# Patient Record
Sex: MALE | Race: WHITE | NOT HISPANIC OR LATINO | Employment: OTHER | URBAN - NONMETROPOLITAN AREA
[De-identification: names, ages, dates, MRNs, and addresses within clinical notes are randomized per-mention and may not be internally consistent; named-entity substitution may affect disease eponyms.]

---

## 2018-01-15 ENCOUNTER — OFFICE VISIT CONVERTED (OUTPATIENT)
Dept: FAMILY MEDICINE CLINIC | Age: 68
End: 2018-01-15
Attending: NURSE PRACTITIONER

## 2018-01-15 ENCOUNTER — CONVERSION ENCOUNTER (OUTPATIENT)
Dept: FAMILY MEDICINE CLINIC | Age: 68
End: 2018-01-15

## 2018-01-16 LAB
ALBUMIN SERPL-MCNC: 4.1 G/DL
ALBUMIN/GLOB SERPL: 1.6 {RATIO}
ALP SERPL-CCNC: 169 IU/L
ALT SERPL-CCNC: 24 IU/L
AST SERPL-CCNC: 25 IU/L
BILIRUB SERPL-MCNC: 0.3 MG/DL
BUN SERPL-MCNC: 11 MG/DL
BUN/CREAT SERPL: 11
CALCIUM SERPL-MCNC: 9.4 MG/DL
CHLORIDE SERPL-SCNC: 103 MMOL/L
CO2 SERPL-SCNC: 25 MMOL/L
CONV TOTAL PROTEIN: 6.7 G/DL
CREAT UR-MCNC: 1.04 MG/DL
ERYTHROCYTE [DISTWIDTH] IN BLOOD BY AUTOMATED COUNT: 13.9 %
GLOBULIN UR ELPH-MCNC: 2.6 G/DL
GLUCOSE SERPL-MCNC: 81 MG/DL
HCT VFR BLD AUTO: 46.3 %
HCV AB SER DONR QL: <0.1 S/CO RATIO
HGB BLD-MCNC: 15.8 G/DL
MCH RBC QN AUTO: 32.1 PG
MCHC RBC AUTO-ENTMCNC: 34.1 G/DL
MCV RBC AUTO: 94 FL
PLATELET # BLD AUTO: 349 X10E3/UL
POTASSIUM SERPL-SCNC: 4.3 MMOL/L
PSA SERPL-MCNC: 5.8 NG/ML
RBC # BLD AUTO: 4.92 X10E6/UL
SODIUM SERPL-SCNC: 143 MMOL/L
WBC # BLD AUTO: 7.7 X10E3/UL

## 2018-01-18 LAB
CHOLEST SERPL-MCNC: 171 MG/DL
HDLC SERPL-MCNC: 32 MG/DL
LDLC SERPL CALC-MCNC: 100 MG/DL
TRIGL SERPL-MCNC: 197 MG/DL
VLDLC SERPL-MCNC: 39 MG/DL

## 2018-04-18 ENCOUNTER — CONVERSION ENCOUNTER (OUTPATIENT)
Dept: FAMILY MEDICINE CLINIC | Age: 68
End: 2018-04-18

## 2018-04-19 LAB
ALBUMIN SERPL-MCNC: 4.1 G/DL
ALBUMIN/GLOB SERPL: 1.8 {RATIO}
ALP SERPL-CCNC: 142 IU/L
ALT SERPL-CCNC: 41 IU/L
AST SERPL-CCNC: 33 IU/L
BILIRUB SERPL-MCNC: 0.4 MG/DL
BUN SERPL-MCNC: 13 MG/DL
BUN/CREAT SERPL: 13
CALCIUM SERPL-MCNC: 9.4 MG/DL
CHLORIDE SERPL-SCNC: 100 MMOL/L
CO2 SERPL-SCNC: 22 MMOL/L
CONV TOTAL PROTEIN: 6.4 G/DL
CREAT UR-MCNC: 0.99 MG/DL
GLOBULIN UR ELPH-MCNC: 2.3 G/DL
GLUCOSE SERPL-MCNC: 86 MG/DL
POTASSIUM SERPL-SCNC: 4.7 MMOL/L
PSA SERPL-MCNC: 4.6 NG/ML
SODIUM SERPL-SCNC: 142 MMOL/L

## 2018-04-20 LAB — GGT SERPL-CCNC: 14 IU/L

## 2018-07-16 ENCOUNTER — CONVERSION ENCOUNTER (OUTPATIENT)
Dept: FAMILY MEDICINE CLINIC | Age: 68
End: 2018-07-16

## 2018-07-19 LAB
ALBUMIN SERPL-MCNC: 3.8 G/DL
ALBUMIN/GLOB SERPL: 1.6 {RATIO}
ALP SERPL-CCNC: 140 IU/L
ALT SERPL-CCNC: 37 IU/L
AST SERPL-CCNC: 34 IU/L
BILIRUB SERPL-MCNC: 0.4 MG/DL
BUN SERPL-MCNC: 12 MG/DL
BUN/CREAT SERPL: 13
CALCIUM SERPL-MCNC: 8.9 MG/DL
CHLORIDE SERPL-SCNC: 104 MMOL/L
CO2 SERPL-SCNC: 23 MMOL/L
CONV TOTAL PROTEIN: 6.2 G/DL
CREAT UR-MCNC: 0.94 MG/DL
GLOBULIN UR ELPH-MCNC: 2.4 G/DL
GLUCOSE SERPL-MCNC: 93 MG/DL
GLUCOSE SERPL-MCNC: NORMAL MG/DL
KETONES UR QL STRIP: NORMAL
PH FLD: NORMAL [PH]
POTASSIUM SERPL-SCNC: 3.8 MMOL/L
PROT FLD-MCNC: NORMAL G/DL
PSA SERPL-MCNC: 4.9 NG/ML
SODIUM SERPL-SCNC: 141 MMOL/L
SP GR UR: NORMAL
SPECIMEN STATUS: NORMAL

## 2018-07-25 ENCOUNTER — OFFICE VISIT CONVERTED (OUTPATIENT)
Dept: FAMILY MEDICINE CLINIC | Age: 68
End: 2018-07-25
Attending: NURSE PRACTITIONER

## 2018-09-05 ENCOUNTER — OFFICE VISIT CONVERTED (OUTPATIENT)
Dept: FAMILY MEDICINE CLINIC | Age: 68
End: 2018-09-05
Attending: NURSE PRACTITIONER

## 2018-11-21 ENCOUNTER — OFFICE VISIT CONVERTED (OUTPATIENT)
Dept: FAMILY MEDICINE CLINIC | Age: 68
End: 2018-11-21
Attending: NURSE PRACTITIONER

## 2018-12-07 ENCOUNTER — OFFICE VISIT CONVERTED (OUTPATIENT)
Dept: FAMILY MEDICINE CLINIC | Age: 68
End: 2018-12-07
Attending: NURSE PRACTITIONER

## 2018-12-08 LAB
ALBUMIN SERPL-MCNC: 4 G/DL
ALBUMIN/GLOB SERPL: 1.4 {RATIO}
ALP SERPL-CCNC: 184 IU/L
ALT SERPL-CCNC: 31 IU/L
AMYLASE SERPL-CCNC: 107 U/L
AST SERPL-CCNC: 24 IU/L
BILIRUB SERPL-MCNC: 0.5 MG/DL
BUN SERPL-MCNC: 11 MG/DL
BUN/CREAT SERPL: 12
CALCIUM SERPL-MCNC: 9.5 MG/DL
CHLORIDE SERPL-SCNC: 100 MMOL/L
CO2 SERPL-SCNC: 23 MMOL/L
CONV TOTAL PROTEIN: 6.8 G/DL
CREAT UR-MCNC: 0.89 MG/DL
ERYTHROCYTE [DISTWIDTH] IN BLOOD BY AUTOMATED COUNT: 13.5 %
GLOBULIN UR ELPH-MCNC: 2.8 G/DL
GLUCOSE SERPL-MCNC: 93 MG/DL
HCT VFR BLD AUTO: 42.4 %
HGB BLD-MCNC: 14.5 G/DL
LIPASE SERPL-CCNC: 24 U/L
MCH RBC QN AUTO: 31.7 PG
MCHC RBC AUTO-ENTMCNC: 34.2 G/DL
MCV RBC AUTO: 93 FL
PLATELET # BLD AUTO: 372 X10E3/UL
POTASSIUM SERPL-SCNC: 4 MMOL/L
RBC # BLD AUTO: 4.58 X10E6/UL
SODIUM SERPL-SCNC: 142 MMOL/L
WBC # BLD AUTO: 10.7 X10E3/UL

## 2018-12-11 ENCOUNTER — OFFICE VISIT CONVERTED (OUTPATIENT)
Dept: SURGERY | Facility: CLINIC | Age: 68
End: 2018-12-11
Attending: UROLOGY

## 2019-01-07 ENCOUNTER — OFFICE VISIT CONVERTED (OUTPATIENT)
Dept: SURGERY | Facility: CLINIC | Age: 69
End: 2019-01-07
Attending: UROLOGY

## 2019-01-16 ENCOUNTER — OFFICE VISIT CONVERTED (OUTPATIENT)
Dept: FAMILY MEDICINE CLINIC | Age: 69
End: 2019-01-16
Attending: NURSE PRACTITIONER

## 2019-01-16 ENCOUNTER — HOSPITAL ENCOUNTER (OUTPATIENT)
Dept: OTHER | Facility: HOSPITAL | Age: 69
Discharge: HOME | End: 2019-01-16
Attending: NURSE PRACTITIONER

## 2019-01-21 LAB
ALBUMIN SERPL-MCNC: 4.1 G/DL
ALP SERPL-CCNC: 173 IU/L
ALT SERPL-CCNC: 20 IU/L
AST SERPL-CCNC: 18 IU/L
BASOPHILS # BLD AUTO: 0 X10E3/UL
BASOPHILS NFR BLD AUTO: 0 %
BILIRUB DIRECT SERPL-MCNC: 0.13 MG/DL
BILIRUB SERPL-MCNC: 0.3 MG/DL
CONV IMMATURE GRAN: 0 %
CONV TOTAL PROTEIN: 6.7 G/DL
EOSINOPHIL # BLD AUTO: 0.1 X10E3/UL
EOSINOPHIL # BLD AUTO: 1 %
ERYTHROCYTE [DISTWIDTH] IN BLOOD BY AUTOMATED COUNT: 13.8 %
HCT VFR BLD AUTO: 43.8 %
HGB BLD-MCNC: 14.9 G/DL
IMM GRANULOCYTES # BLD: 0 X10E3/UL
LYMPHOCYTES # BLD AUTO: 2.3 X10E3/UL
LYMPHOCYTES NFR BLD AUTO: 24 %
MCH RBC QN AUTO: 31.9 PG
MCHC RBC AUTO-ENTMCNC: 34 G/DL
MCV RBC AUTO: 94 FL
MONOCYTES # BLD AUTO: 0.9 X10E3/UL
MONOCYTES NFR BLD AUTO: 10 %
NEUTROPHILS # BLD AUTO: 6.1 X10E3/UL
NEUTROPHILS NFR BLD AUTO: 65 %
PLATELET # BLD AUTO: 329 X10E3/UL
PSA SERPL-MCNC: 5.2 NG/ML
RBC # BLD AUTO: 4.67 X10E6/UL
UREA BREATH TEST QL: NEGATIVE
WBC # BLD AUTO: 9.4 X10E3/UL

## 2019-02-01 ENCOUNTER — CONVERSION ENCOUNTER (OUTPATIENT)
Dept: GASTROENTEROLOGY | Facility: CLINIC | Age: 69
End: 2019-02-01

## 2019-02-01 ENCOUNTER — OFFICE VISIT CONVERTED (OUTPATIENT)
Dept: GASTROENTEROLOGY | Facility: CLINIC | Age: 69
End: 2019-02-01
Attending: PHYSICIAN ASSISTANT

## 2019-02-13 ENCOUNTER — HOSPITAL ENCOUNTER (OUTPATIENT)
Dept: GASTROENTEROLOGY | Facility: HOSPITAL | Age: 69
Setting detail: HOSPITAL OUTPATIENT SURGERY
Discharge: HOME | End: 2019-02-13
Attending: INTERNAL MEDICINE

## 2020-06-18 ENCOUNTER — HOSPITAL ENCOUNTER (OUTPATIENT)
Dept: OTHER | Facility: HOSPITAL | Age: 70
Discharge: HOME | End: 2020-06-18
Attending: FAMILY MEDICINE

## 2020-06-18 ENCOUNTER — OFFICE VISIT CONVERTED (OUTPATIENT)
Dept: FAMILY MEDICINE CLINIC | Age: 70
End: 2020-06-18
Attending: FAMILY MEDICINE

## 2020-06-21 LAB — SARS-COV-2 RNA SPEC QL NAA+PROBE: NOT DETECTED

## 2020-10-21 ENCOUNTER — HOSPITAL ENCOUNTER (OUTPATIENT)
Dept: OTHER | Facility: HOSPITAL | Age: 70
Discharge: HOME | End: 2020-10-21
Attending: FAMILY MEDICINE

## 2020-10-21 ENCOUNTER — CONVERSION ENCOUNTER (OUTPATIENT)
Dept: FAMILY MEDICINE CLINIC | Age: 70
End: 2020-10-21

## 2020-10-24 LAB — SARS-COV-2 RNA SPEC QL NAA+PROBE: NOT DETECTED

## 2021-03-19 ENCOUNTER — OFFICE VISIT CONVERTED (OUTPATIENT)
Dept: FAMILY MEDICINE CLINIC | Age: 71
End: 2021-03-19
Attending: NURSE PRACTITIONER

## 2021-04-07 ENCOUNTER — OFFICE VISIT CONVERTED (OUTPATIENT)
Dept: NEUROLOGY | Facility: CLINIC | Age: 71
End: 2021-04-07
Attending: PSYCHIATRY & NEUROLOGY

## 2021-05-11 NOTE — H&P
History and Physical      Patient Name: Tristan Bolivar   Patient ID: [id number]   Sex: Male   YOB: 1950    Primary Care Provider: Ashley GARCIA   Referring Provider: Ashley GARCIA    Visit Date: April 7, 2021    Provider: Ron Torres MD   Location: [facility]   Location Address: [address] [place] [address]  [id number]   Location Phone: [phone]          Chief Complaint     New patient presented to us today for tremors, patient has not had any head or brain imaging done recently.       History Of Present Illness  Tristan Bolivar is a 70 year old /White male who presents today to [facility] today referred from Ashley GARCIA.      70-year-old man evaluated for tremors.  His sister who is my patient in the past brought him here.  The patient has developmental delay and lives by himself.  He has never been  and has no children.  His sister states that he has had tremor for the last 5 years.  It is in both hands but worse on the right side.  It interferes with his eating and shaving.  He used to work at [employer] in [facility].  There is no family history of essential tremor.  He is taking primidone at 50 mg nightly.  This is not been increased.  He is also taking propranolol.  He is independent with all activities of daily living.  He does not drive.    The sister states that the patient had developmental delay.  He would not play with other children and he just looked around when he was a child.       Past Medical History  Tremors         Past Surgical History  Hip Surgery         Medication List  Centrum Silver 400-250 mcg oral tablet,chewable; Fish Oil 120-180 mg oral capsule; primidone 50 mg oral tablet; propranolol 10 mg oral tablet; Vitamin D3 400 unit oral capsule; Zyrtec 10 mg oral capsule         Allergy List  NO KNOWN DRUG ALLERGIES       Allergies Reconciled  Family Medical History  -  "No Family History of Colorectal Cancer         Social History  Tobacco (Current every day)         Review of Systems  · Constitutional  o Denies  o : chills, excessive sweating, fatigue, fever, sycope/passing out, weight gain, weight loss  · Eyes  o Denies  o : changes in vision, blurred vision, double vision  · HENT  o Admits  o : seasonal allergies  o Denies  o : hearing loss, ringing in the ears, ear aches, sore throat, nasal congestion, sinus pain, nose bleeds  · Cardiovascular  o Denies  o : blood clots, swollen legs, anemia, easy burising or bleeding, transfusions  · Respiratory  o Denies  o : shortness of breath, dry cough, productive cough, pneumonia, COPD  · Gastrointestinal  o Denies  o : dysphagia, reflux  · Genitourinary  o Denies  o : incontinence  · Neurologic  o Admits  o : tremor, loss of balance, numbness/tingling/paresthesia   o Denies  o : headache, seizure, stroke, falls, dizziness/vertigo, difficulty with sleep, difficulty with coordination, difficulty with dexterity, weakness  · Musculoskeletal  o Admits  o : low back pain  o Denies  o : neck stiffness/pain, swollen lymph nodes, muscle aches, joint pain, weakness, spasms, sciatica, pain radiating in arm, pain radiating in leg  · Endocrine  o Denies  o : diabetes, thyroid disorder  · Psychiatric  o Denies  o : anxiety, depression      Vitals  Date Time BP Position Site L\R Cuff Size HR RR TEMP (F) WT  HT  BMI kg/m2 BSA m2 O2 Sat FR L/min FiO2 HC       04/07/2021 10:38 /78 Sitting    83 - R   153lbs 9oz 5'  7\" 24.05 1.81             Physical Examination     He is alert, fluent, slow to answer, follows commands.  He has mild dysarthria.  EOMs are full all directions gaze, facial strength is full.  There is no weakness of the upper or lower extremities individual muscle testing.  Fine finger movements are intact.  There is a tremor noted on Archimedes spiral.  There is a mild tremor noted hands extended testing is seen which is the target.  " There is no rigidity or cogwheeling.  Reflexes are decreased in the biceps, triceps, patellar's and ankles.  Cerebellar testing is intact.  Station and gait he walks with slow gait and with stress gait there is posturing noted in the right arm and body.  He is able to tiptoe, heel walk, doug.  Heart is regular in rhythm normal in rate.           Assessment  · Essential tremor       Essential tremor     333.1/G25.0  I discussed with his sister and the patient that he has essential tremor. I will increase the primidone by 50 mg every 3 days at bedtime. He is to stay at the lowest dose that controls his tremor. I explained to the sister how to titrate the medication. She is the one to check on this medication. If his tremors are not improved by taking 250 mg nightly he is to increase it in 2 divided dosage morning and evening until he is taking up to 250 mg twice daily. He is to stay at the lowest dose that controls his tremor. The sister is well aware of this. I explained to the sister and the patient the adverse effects of higher doses of primidone including feeling like she is unsteady, confused, slurred speech, intoxicated.    If there is no improvement of his tremor with higher doses of primidone I will start him on higher doses of propranolol if there is no contraindication such as asthma. I will see him again in 1 month's time for follow-up.    Total time spent with patient coordinating patient care was 40 minutes.      Plan  · Medications  o primidone 50 mg oral tablet   SIG: take up to 5 tablets (250 mg) by oral route 2 times per day   DISP: (300) Tablet with 3 refills  Prescribed on 04/07/2021     o Medications have been Reconciled  o Transition of Care or Provider Policy  · Instructions  o Encouraged to follow-up with Primary Care Provider for preventative care.            Electronically Signed by: Ron Torres MD -Author on April 7, 2021 12:46:21 PM

## 2021-05-14 VITALS
HEIGHT: 67 IN | DIASTOLIC BLOOD PRESSURE: 78 MMHG | WEIGHT: 153.56 LBS | BODY MASS INDEX: 24.1 KG/M2 | HEART RATE: 83 BPM | SYSTOLIC BLOOD PRESSURE: 148 MMHG

## 2021-05-16 VITALS
DIASTOLIC BLOOD PRESSURE: 85 MMHG | OXYGEN SATURATION: 98 % | SYSTOLIC BLOOD PRESSURE: 139 MMHG | RESPIRATION RATE: 16 BRPM | HEIGHT: 67 IN | HEART RATE: 87 BPM | WEIGHT: 168 LBS | BODY MASS INDEX: 26.37 KG/M2

## 2021-05-16 VITALS — RESPIRATION RATE: 12 BRPM | BODY MASS INDEX: 27.51 KG/M2 | WEIGHT: 175.25 LBS | HEIGHT: 67 IN

## 2021-05-16 VITALS — HEIGHT: 67 IN | WEIGHT: 175 LBS | BODY MASS INDEX: 27.47 KG/M2 | RESPIRATION RATE: 16 BRPM

## 2021-05-18 NOTE — PROGRESS NOTES
Tristan Bolivar 1950     Office/Outpatient Visit    Visit Date: Wed, Nov 21, 2018 01:44 pm    Provider: Ashley Robertosn N.P. (Assistant: Sarah Spurling, MA)    Location: [facility]        Electronically signed by Ashley Robertson N.P. on  11/21/2018 03:09:11 PM                             SUBJECTIVE:        CC:     Emir is a 68 year old White male.  He has an extremely bad cough, his belly has been hurting from the cough. The cough started last week.  accompanied by sister, Bethany,  today         HPI:         Patient complains of cough.  Respiratory symptoms include chest congestion, cough, chest tightness, wheezing and abdominal pain from coughing.  Other symptoms include chest congestion and wheezing.  He denies ear complaints, fever, headache, nasal congestion or nasal discharge.  Sputum is described as clear.  He denies exposure to ill contacts.  He has already tried to relieve the symptoms with taking night and day quil.  Medical history is significant for COPD and tobacco use.      ROS:     CONSTITUTIONAL:  Negative for chills, fatigue and fever.      CARDIOVASCULAR:  Negative for chest pain and pedal edema.      RESPIRATORY:  Positive for recent cough, pleuritic chest pain and frequent wheezing.   Negative for dyspnea.      GASTROINTESTINAL:  Positive for abdominal pain ( muscles of his upper abdominal wall ).   Negative for constipation, diarrhea, heartburn, nausea or vomiting.      MUSCULOSKELETAL:  Positive for myalgias (abdominal muscles (from coughing)).      ENDOCRINE:  Negative for hair loss, polydipsia and polyphagia.      ALLERGIC/IMMUNOLOGIC:  Negative for seasonal allergies.      PSYCHIATRIC:  Negative for anxiety, depression and suicidal thoughts.          PMH/FMH/SH:     Last Reviewed on 9/05/2018 09:54 AM by Ashley Robertson    Past Medical History:             PAST MEDICAL HISTORY         Peptic Ulcer Disease     Renal Stones         PREVENTIVE HEALTH MAINTENANCE              EVALUATION FOR AORTIC ANEURYSM: has never been done The next one is due  NOW     COLORECTAL CANCER SCREENING: Up to date (colonoscopy q10y; sigmoidoscopy q5y; Cologuard q3y) was last done 3/9/2018, Results are in chart; colonoscopy with the following abnormalities noted-- tubular adenoma; The next colonoscopy is due  03/2021     DENTAL CLEANING: NOW     EYE EXAM: The next one is due  NOW     Hepatitis C Medicare Screening: DUE NOW     PSA: was last done 4/18/12         PAST MEDICAL HISTORY             ADVANCE DIRECTIVE Living will on file         Surgical History:         Fracture Repair: left hip;         Family History:     Father: Medical history unknown     Mother:    Positive for Alzheimer's Disease;     Brother(s):    Positive for Myocardial Infarction;     Sister(s):    Positive for Type 2 Diabetes;         Social History:     Occupation:    Disabled     Marital Status: Single     Children: None     A.D.L.s:  At his current level of functioning, he can converse in a meaningful manner, dress himself, recognize familiar faces and remember the current date.   He cannot bathe himself, clean the house, control his bladder, control his bowel function, cook meals, drive a car, feed himself, find his way home, live independently, ride public transportation, remember his name or remember where he lives.          Tobacco/Alcohol/Supplements:     Last Reviewed on 11/21/2018 01:48 PM by Spurling, Sarah C    Tobacco: He has a past history of cigarette smoking; quit date:  Threw all his cigarettes in the trash today. (11-21-18).  Non-drinker     Caffeine:  He admits to consuming caffeine via coffee ( 1 serving per day ) and tea ( 2 servings per day ).          Substance Abuse History:     Last Reviewed on 1/15/2018 10:35 AM by Ashley Robertson    None         Mental Health History:     Last Reviewed on 1/15/2018 09:45 AM by Ashley Robertson        OTHER (enter)developmental delay         Communicable Diseases (eg STDs):      Last Reviewed on 1/15/2018 09:45 AM by Ashley Robertson    Reportable health conditions; NEGATIVE             Current Problems:     Last Reviewed on 9/05/2018 09:54 AM by Ashley Robertson    GERD     Abdominal pain, other specified site     Acute bronchitis     Cigarette smoking     Essential Tremor         Immunizations:     Prevnar 13 (Pneumococcal PCV 13) 1/15/2018     Fluzone High-Dose pf (>=65 yr) 10/5/2016     Fluzone High-Dose pf (>=65 yr) 9/14/2017     Fluzone High-Dose pf (>=65 yr) 9/27/2018         Allergies:     Last Reviewed on 11/21/2018 01:44 PM by Spurling, Sarah C      No Known Drug Allergies.         Current Medications:     Last Reviewed on 11/21/2018 01:47 PM by Spurling, Sarah C    Propranolol HCl 40mg Tablet Take 1 tablet(s) by mouth bid     Cetirizine HCl 10mg Tablet 1 tab daily     Vitamin D3 5,000IU Capsules 1 capsule every day     Primidone 50mg Tablet 1 at bedtime     Centrum Silver Vitamin* vitamin 1 daily     fish oil 1 tab daily         OBJECTIVE:        Vitals:         Current: 11/21/2018 1:51:49 PM    Ht:  5 ft, 8.5 in;  Wt: 174.4 lbs;  BMI: 26.1    T: 98.6 F (oral);  BP: 129/71 mm Hg (left arm, sitting);  P: 80 bpm (left arm (BP Cuff), sitting);  sCr: 0.94 mg/dL;  GFR: 72.00    O2 Sat: 96 % (room air)        Exams:     PHYSICAL EXAM:     GENERAL: Vitals recorded well developed, well nourished;  no apparent distress;     NECK: range of motion is normal;     RESPIRATORY: normal appearance and symmetric expansion of chest wall; normal respiratory rate and pattern with no distress; decreased breath sounds throughout; expiratory wheezes in the RUL and RLL;     CARDIOVASCULAR: normal rate; rhythm is regular;  no edema;     GASTROINTESTINAL: mild LUQ and RUQ tenderness;     LYMPHATIC: no enlargement of cervical or facial nodes; no supraclavicular nodes;     MUSCULOSKELETAL: normal gait; normal range of motion of all major muscle groups; no limb or joint pain with range of motion;      NEUROLOGIC: mental status: alert and oriented x 3;     PSYCHIATRIC: appropriate affect and demeanor; normal speech pattern; normal thought and perception;         ASSESSMENT           466.0   J20.9  Acute bronchitis              DDx:     305.1   F17.200  Cigarette smoking              DDx:     789.09   R10.9  Abdominal pain, other specified site              DDx:         ORDERS:         Meds Prescribed:       Azithromycin 250mg Tablet 2 po today, 1 po x 4 days  #1 (One) tablet(s) Refills: 0       Guaifenesin 100mg/5ml Oral Liquid Take 1 teaspoon by mouth q4h prn cough  #6 (Six) oz Refills: 0       Medrol (Methylprednisolone) 4mg Dosepak Take as directed with food  #1 (One) dose pack Refills: 0         Other Orders:         DuoNeb (Ipratropium) unit does for nebulizer  (Send-Out)         4004F  Pt scrnd tobacco use rcvd tobacco cessation talk  (In-House)                   PLAN:          Acute bronchitis           Prescriptions:       Azithromycin 250mg Tablet 2 po today, 1 po x 4 days  #1 (One) tablet(s) Refills: 0       Guaifenesin 100mg/5ml Oral Liquid Take 1 teaspoon by mouth q4h prn cough  #6 (Six) oz Refills: 0       Medrol (Methylprednisolone) 4mg Dosepak Take as directed with food  #1 (One) dose pack Refills: 0           Orders:         DuoNeb (Ipratropium) unit does for nebulizer  (Send-Out)            Cigarette smoking reports that his last cigarette was last night and has 'thrown away' the rest of them and is trying to quit         RECOMMENDATIONS given include: Counseled on smoking cessation and advised of the benefits to patient's health if he were to stop smoking. Counseling for less than 3 minutes.            Orders:       4004F  Pt scrnd tobacco use rcvd tobacco cessation talk  (In-House)            Abdominal pain, other specified site keep cough under control and should self limit - if continues to worsen, or GI symptoms develop, will need to follow up             Patient Recommendations:         For  Cigarette smoking:         Stop smoking.              CHARGE CAPTURE           **Please note: ICD descriptions below are intended for billing purposes only and may not represent clinical diagnoses**        Primary Diagnosis:         466.0 Acute bronchitis            J20.9    Acute bronchitis, unspecified              Orders:          89813   Office/outpatient visit; established patient, level 3  (In-House)           305.1 Cigarette smoking            F17.200    Nicotine dependence, unspecified, uncomplicated              Orders:          4004F   Pt scrnd tobacco use rcvd tobacco cessation talk  (In-House)           789.09 Abdominal pain, other specified site            R10.9    Unspecified abdominal pain

## 2021-05-18 NOTE — PROGRESS NOTES
Tristan Bolivar 1950     Office/Outpatient Visit    Visit Date: Wed, Sep 5, 2018 09:07 am    Provider: Ashley Robertson N.P. (Assistant: Cheryl Ortiz MA)    Location: [facility]        Electronically signed by Ashley Robertson N.P. on  09/05/2018 09:55:15 AM                             SUBJECTIVE:        CC:     Emir is a 68 year old White male.  This is a follow-up visit.  The patient is accompanied into the exam room by his Sister - Bethany.  patient presents today for 6 week follow up         HPI:         Emir desires help with smoking cessation.  He reports smoking 1 1/2 packs per day.  He has smoked for 40+ years.  He smokes the most at home.  There is no significant contact with other smokers.  Emir has never previously tried to quit smoking.  Medical history is pertinent for Mild MRJamarcus Field was in 6 weeks ago with his sister Bethany and was put on Wellbutrin for cessation - and reports today, that he has not attempted to cut back and does not desire to do so.  His sister Bethany is with him and has tried to convince him of the benefits of quitting.  He continues to decline desire to quit     ROS:     CONSTITUTIONAL:  Negative for chills, fatigue, fever and weight change.      CARDIOVASCULAR:  Negative for chest pain, orthopnea, paroxysmal nocturnal dyspnea and pedal edema.      RESPIRATORY:  Positive for chronic cough, dyspnea and frequent wheezing.      GASTROINTESTINAL:  Negative for abdominal pain, heartburn, constipation, diarrhea, and stool changes.      PSYCHIATRIC:  Negative for anxiety and depression.          PMH/FMH/SH:     Last Reviewed on 9/05/2018 09:54 AM by Ashley Robertson    Past Medical History:             PAST MEDICAL HISTORY         Peptic Ulcer Disease     Renal Stones         PREVENTIVE HEALTH MAINTENANCE             EVALUATION FOR AORTIC ANEURYSM: has never been done The next one is due  NOW     COLORECTAL CANCER SCREENING: Up to date (colonoscopy q10y; sigmoidoscopy q5y;  Cologuard q3y) was last done 3/9/2018, Results are in chart; colonoscopy with the following abnormalities noted-- tubular adenoma; The next colonoscopy is due  03/2021     DENTAL CLEANING: NOW     EYE EXAM: The next one is due  NOW     Hepatitis C Medicare Screening: DUE NOW     PSA: was last done 4/18/12         PAST MEDICAL HISTORY             ADVANCE DIRECTIVE Living will on file         Surgical History:         Fracture Repair: left hip;         Family History:     Father: Medical history unknown     Mother:    Positive for Alzheimer's Disease;     Brother(s):    Positive for Myocardial Infarction;     Sister(s):    Positive for Type 2 Diabetes;         Social History:     Occupation:    Disabled     Marital Status: Single     Children: None     A.D.L.s:  At his current level of functioning, he can converse in a meaningful manner, dress himself, recognize familiar faces and remember the current date.   He cannot bathe himself, clean the house, control his bladder, control his bowel function, cook meals, drive a car, feed himself, find his way home, live independently, ride public transportation, remember his name or remember where he lives.          Tobacco/Alcohol/Supplements:     Last Reviewed on 9/05/2018 09:10 AM by Cheryl Ortiz    Tobacco: Current Smoker: He currently smokes every day, 1-1/2 packs per day.  Non-drinker     Caffeine:  He admits to consuming caffeine via coffee ( 1 serving per day ) and tea ( 2 servings per day ).          Substance Abuse History:     Last Reviewed on 1/15/2018 10:35 AM by Ashley Robertson    None         Mental Health History:     Last Reviewed on 1/15/2018 09:45 AM by Ashley Robertson        OTHER (enter)developmental delay         Communicable Diseases (eg STDs):     Last Reviewed on 1/15/2018 09:45 AM by Ashley Robertson    Reportable health conditions; NEGATIVE             Current Problems:     Last Reviewed on 9/05/2018 09:54 AM by Ashley Robertson    GERD      Elevated prostate specific antigen (PSA)     Cigarette smoking     Essential Tremor         Immunizations:     Prevnar 13 (Pneumococcal PCV 13) 1/15/2018     Fluzone High-Dose pf (>=65 yr) 10/5/2016     Fluzone High-Dose pf (>=65 yr) 9/14/2017         Allergies:     Last Reviewed on 9/05/2018 09:10 AM by Cheryl Ortiz      No Known Drug Allergies.         Current Medications:     Last Reviewed on 9/05/2018 09:10 AM by Cheryl Ortiz    Propranolol HCl 40mg Tablet Take 1 tablet(s) by mouth bid     Cetirizine HCl 10mg Tablet 1 tab daily     Wellbutrin SR 150mg Tablets, Sustained Release Take 1 tablet PO daily for 2 weeks, if tolerated increase to BID     Vitamin D3 5,000IU Capsules 1 capsule every day     Primidone 50mg Tablet 1 at bedtime     Centrum Silver Vitamin* vitamin 1 daily     fish oil 1 tab daily         OBJECTIVE:        Vitals:         Current: 9/5/2018 9:13:22 AM    Ht:  5 ft, 8.5 in;  Wt: 167 lbs;  BMI: 25.0    T: 97.9 F (oral);  BP: 132/69 mm Hg (left arm, sitting);  P: 80 bpm (left arm (BP Cuff), sitting);  sCr: 0.94 mg/dL;  GFR: 70.69        Exams:     PHYSICAL EXAM:     GENERAL: Vitals recorded well developed, well nourished;  well groomed;  no apparent distress;     NECK:  supple, full ROM; no thyromegaly; no carotid bruits;     RESPIRATORY: normal respiratory rate and pattern with no distress; decreased breath sounds throughout;     CARDIOVASCULAR: normal rate; rhythm is regular;  normal S1; normal S2; no systolic murmur; no cyanosis; no edema;     MUSCULOSKELETAL:  Normal range of motion, strength and tone;     NEUROLOGICAL:  cranial nerves, motor and sensory function, reflexes, gait and coordination are all intact;     PSYCHIATRIC:  appropriate affect and demeanor; normal speech pattern; grossly normal memory;         ASSESSMENT:           305.1   F17.200  Cigarette smoking              DDx:         PLAN:          Cigarette smoking I recommend that at this time, we stop the wellbutrin due to  his lack of desire to quit.  I have given him some ideas to try to help cut back - he has agreed to try - however, given his mental capacity, I feel that smoking cessation will be a challenge as he has a specific routine that he follows and smoking has become a part of that routine             CHARGE CAPTURE:           Primary Diagnosis:     305.1 Cigarette smoking            F17.200    Nicotine dependence, unspecified, uncomplicated              Orders:          28641   Office/outpatient visit; established patient, level 3  (In-House)

## 2021-05-18 NOTE — PROGRESS NOTES
Tristan Bolivar 1950     Office/Outpatient Visit    Visit Date: Fri, Dec 7, 2018 11:02 am    Provider: Ashley Robertson N.P. (Assistant: Mehdi Del Rosario)    Location: [facility]        Electronically signed by Ashley Robertson N.P. on  12/11/2018 09:59:24 AM                             SUBJECTIVE:        CC:     Emir is a 68 year old White male.  stomach and back pain since last week, runny nose, vomiting         HPI:         Patient complains of stomach pain.  Emir complains of abdominal pain that is diffuse in location.  There is some radiation to the flank and bilateral flank.      ROS:     CONSTITUTIONAL:  Negative for chills, fatigue, fever and weight change.      CARDIOVASCULAR:  Negative for chest pain, orthopnea, paroxysmal nocturnal dyspnea and pedal edema.      RESPIRATORY:  Positive for recent cough.   Negative for dyspnea or cough.      GASTROINTESTINAL:  Negative for abdominal pain, heartburn, constipation, diarrhea, and stool changes.      PSYCHIATRIC:  Negative for anxiety and depression.          PMH/FMH/SH:     Last Reviewed on 9/05/2018 09:54 AM by Ashley Robertson    Past Medical History:             PAST MEDICAL HISTORY         Peptic Ulcer Disease     Renal Stones         PREVENTIVE HEALTH MAINTENANCE             EVALUATION FOR AORTIC ANEURYSM: has never been done The next one is due  NOW     COLORECTAL CANCER SCREENING: Up to date (colonoscopy q10y; sigmoidoscopy q5y; Cologuard q3y) was last done 3/9/2018, Results are in chart; colonoscopy with the following abnormalities noted-- tubular adenoma; The next colonoscopy is due  03/2021     DENTAL CLEANING: NOW     EYE EXAM: The next one is due  NOW     Hepatitis C Medicare Screening: DUE NOW     PSA: was last done 4/18/12         PAST MEDICAL HISTORY             ADVANCE DIRECTIVE Living will on file         Surgical History:         Fracture Repair: left hip;         Family History:     Father: Medical history unknown      Mother:    Positive for Alzheimer's Disease;     Brother(s):    Positive for Myocardial Infarction;     Sister(s):    Positive for Type 2 Diabetes;         Social History:     Occupation:    Disabled     Marital Status: Single     Children: None     A.D.L.s:  At his current level of functioning, he can converse in a meaningful manner, dress himself, recognize familiar faces and remember the current date.   He cannot bathe himself, clean the house, control his bladder, control his bowel function, cook meals, drive a car, feed himself, find his way home, live independently, ride public transportation, remember his name or remember where he lives.          Tobacco/Alcohol/Supplements:     Last Reviewed on 12/07/2018 11:06 AM by Mehdi Del Rosario    Tobacco: Current Smoker: He currently smokes every day, 1 pack per day.  Non-drinker     Caffeine:  He admits to consuming caffeine via coffee ( 1 serving per day ) and tea ( 2 servings per day ).          Substance Abuse History:     Last Reviewed on 1/15/2018 10:35 AM by Ashley Robertson    None         Mental Health History:     Last Reviewed on 1/15/2018 09:45 AM by Ashely Robertson        OTHER (enter)developmental delay         Communicable Diseases (eg STDs):     Last Reviewed on 1/15/2018 09:45 AM by Ashley Robertson    Reportable health conditions; NEGATIVE             Current Problems:     Last Reviewed on 9/05/2018 09:54 AM by Ashley Robertson    GERD     Nausea     Cough     Stomach pain     Abdominal pain, other specified site     Acute bronchitis     Cigarette smoking     Essential Tremor         Immunizations:     Prevnar 13 (Pneumococcal PCV 13) 1/15/2018     Fluzone High-Dose pf (>=65 yr) 10/5/2016     Fluzone High-Dose pf (>=65 yr) 9/14/2017     Fluzone High-Dose pf (>=65 yr) 9/27/2018         Allergies:     Last Reviewed on 12/07/2018 11:06 AM by Mehdi Del Rosario      No Known Drug Allergies.         Current Medications:     Last Reviewed on 12/07/2018  11:07 AM by Mehdi Del Rosario    Propranolol HCl 40mg Tablet Take 1 tablet(s) by mouth bid     Cetirizine HCl 10mg Tablet 1 tab daily     Vitamin D3 5,000IU Capsules 1 capsule every day     Primidone 50mg Tablet 1 at bedtime     Centrum Silver Vitamin* vitamin 1 daily     fish oil 1 tab daily         OBJECTIVE:        Vitals:         Current: 12/7/2018 11:09:18 AM    Ht:  5 ft, 8.5 in;  Wt: 170.6 lbs;  BMI: 25.6    T: 97.6 F (oral);  BP: 156/91 mm Hg (right arm, sitting);  P: 98 bpm (right arm (BP Cuff), sitting);  sCr: 0.94 mg/dL;  GFR: 71.33        Exams:     PHYSICAL EXAM:     GENERAL: Vitals recorded well developed, well nourished;  well groomed;  no apparent distress;     NECK:  supple, full ROM; no thyromegaly; no carotid bruits;     RESPIRATORY: normal respiratory rate and pattern with no distress; normal breath sounds with no rales, rhonchi, wheezes or rubs;     CARDIOVASCULAR: normal rate; rhythm is regular;  normal S1; normal S2; no systolic murmur; no cyanosis; no edema;     GASTROINTESTINAL: nontender, nondistended; no hepatosplenomegaly or masses; no bruits;     SKIN:  no significant rashes or lesions; no suspicious moles;     MUSCULOSKELETAL: normal gait; CVA tenderness over bilateral flank  left worse than right;     NEUROLOGICAL:  cranial nerves, motor and sensory function, reflexes, gait and coordination are all intact;     PSYCHIATRIC:  appropriate affect and demeanor; normal speech pattern; grossly normal memory;         ASSESSMENT:           789.07   R10.84  Stomach pain              DDx:     786.2   R05  Cough              DDx:     787.02   R11.0  Nausea              DDx:         ORDERS:         Meds Prescribed:       Zofran (Ondansetron HCl) 4mg Tablet 1 po q 6 hours prn  #20 (Twenty) tablet(s) Refills: 0         Radiology/Test Orders:       [id number]  Radiologic exam chest 2 views  (Send-Out)           Lab Orders:       [id number]  [id number] [facility] Amylase, Serum  (Send-Out)         [id number]  [id number] [facility]  CBC without diff  (Send-Out)         [id number]  [id number] [facility] Comp Metabolic Panel (14)  (Send-Out)         [id number]  [id number] [facility] Lipase, Serum  (Send-Out)         [id number]  [id number] [facility] Urinalysis, automated with REFLEX to culture  (Send-Out)                   PLAN:          Stomach pain     LABORATORY:  Labs ordered to be performed today at [facility] include amylase, CBC W/O DIFF, Lipase, and Urinalysis with REFLEX to culture.  CMP           Orders:       [id number]  [id number] [facility] Amylase, Serum  (Send-Out)         [id number]  [id number] [facility] CBC without diff  (Send-Out)         [id number]  [id number] [facility] Comp Metabolic Panel (14)  (Send-Out)         [id number]  [id number] [facility] Lipase, Serum  (Send-Out)         [id number]  [id number] [facility] Urinalysis, automated with REFLEX to culture  (Send-Out)            Cough         RADIOLOGY:  I have ordered a chest x-ray (PA and lateral) to be done today.            Orders:       [id number]  Radiologic exam chest 2 views  (Send-Out)            Nausea           Prescriptions:       Zofran (Ondansetron HCl) 4mg Tablet 1 po q 6 hours prn  #20 (Twenty) tablet(s) Refills: 0             CHARGE CAPTURE:           Primary Diagnosis:     789.07 Stomach pain            R10.84    Generalized abdominal pain              Orders:          [id number]   Office/outpatient visit; established patient, level 3  (In-House)           786.2 Cough            R05    Cough    787.02 Nausea            R11.0    Nausea

## 2021-05-18 NOTE — PROGRESS NOTES
Tristan Bolivar 1950     Office/Outpatient Visit    Visit Date: Wed, Jan 16, 2019 10:31 am    Provider: Ashley Robertson N.P. (Assistant: Edel Ferraro)    Location: [facility]        Electronically signed by Ashley Robertson N.P. on  01/19/2019 09:39:19 PM                             SUBJECTIVE:        CC:     Emir is a 68 year old White male.  The patient is accompanied into the exam room by his sister.  back and stomach pain follow up         HPI:         Patient complains of low back pain.  Reason for visit: Pain.  This is a follow-up visit. His symptoms are unchanged since last visit.  The discomfort is most prominent in the lower lumbar spine.  This radiates to the abdomen.  He characterizes it as intermittent and moderate in intensity.  The event which precipitated this pain was Recently had ESWL for kidney stones - was evaluated by urology for this pain after procedure and felt that this was not related to his pain.  Associated symptoms include nausea.  He denies fever or vomiting.  He notes some pain relief with pain medication.  The pain worsens with seems like his pain is worse at night - he is calling Bethany (his sister) around 2am stating that his pain is bad.      ROS:     CONSTITUTIONAL:  Negative for chills, fatigue and fever.      CARDIOVASCULAR:  Negative for chest pain and pedal edema.      RESPIRATORY:  Positive for recent cough and wheezing ( (current smoker) ).   Negative for dyspnea.      GASTROINTESTINAL:  Positive for abdominal pain ( periumbilical; diffuse ), anorexia, nausea and vomiting.   Negative for acid reflux symptoms, constipation or diarrhea.      GENITOURINARY:  Positive for nocturia, recent stones and urinary frequency.   Negative for dysuria, hematuria or change in urine stream.      MUSCULOSKELETAL:  Positive for back pain ( acute ).   Negative for arthralgias or myalgias.      INTEGUMENTARY:  Negative for pruritis and rash.      ENDOCRINE:  Negative for hair  · Status post ERCP yesterday  · Clinically improving LFTs  · Continue to trend  · Continue IV antibiotics loss, polydipsia and polyphagia.      ALLERGIC/IMMUNOLOGIC:  Negative for seasonal allergies.      PSYCHIATRIC:  Negative for anxiety, depression and suicidal thoughts.          PMH/FMH/SH:     Last Reviewed on 9/05/2018 09:54 AM by Ashley Robertson    Past Medical History:             PAST MEDICAL HISTORY         Peptic Ulcer Disease     Renal Stones         PREVENTIVE HEALTH MAINTENANCE             EVALUATION FOR AORTIC ANEURYSM: has never been done The next one is due  NOW     COLORECTAL CANCER SCREENING: Up to date (colonoscopy q10y; sigmoidoscopy q5y; Cologuard q3y) was last done 3/9/2018, Results are in chart; colonoscopy with the following abnormalities noted-- tubular adenoma; The next colonoscopy is due  03/2021     DENTAL CLEANING: NOW     EYE EXAM: The next one is due  NOW     Hepatitis C Medicare Screening: DUE NOW     PSA: was last done 4/18/12         PAST MEDICAL HISTORY             ADVANCE DIRECTIVE Living will on file         PAST MEDICAL HISTORY         Positive for    Renal Stones: dx'd in 12/2018; he has undergone ureteral stent placement; ;         Surgical History:         Fracture Repair: left hip;         Family History:     Father: Medical history unknown     Mother:    Positive for Alzheimer's Disease;     Brother(s):    Positive for Myocardial Infarction;     Sister(s):    Positive for Type 2 Diabetes;         Social History:     Occupation:    Disabled     Marital Status: Single     Children: None     A.D.L.s:  At his current level of functioning, he can bathe himself, control his bladder, control his bowel function, converse in a meaningful manner, dress himself, feed himself, live independently, recognize familiar faces and remember his name.   He cannot clean the house, cook meals, drive a car, remember where he lives or remember the current date.          Tobacco/Alcohol/Supplements:     Last Reviewed on 1/16/2019 10:31 AM by Edel Ferraro    Tobacco: Current Smoker: He currently  smokes every day, 1 pack per day.  Non-drinker     Caffeine:  He admits to consuming caffeine via coffee ( 1 serving per day ) and tea ( 2 servings per day ).          Substance Abuse History:     Last Reviewed on 1/15/2018 10:35 AM by Ashley Robertson    None         Mental Health History:     Last Reviewed on 1/15/2018 09:45 AM by Ashley Robertson        OTHER (enter)developmental delay         Communicable Diseases (eg STDs):     Last Reviewed on 1/15/2018 09:45 AM by Ashley Robertson    Reportable health conditions; NEGATIVE             Current Problems:     Last Reviewed on 9/05/2018 09:54 AM by Ashley Robertson    Mild intellectual disabilities     Low back pain     kidney stones     kidney stones (12/18)     GERD     Abdominal pain, other specified site     Other abnormal laboratory result on blood     Elevated PSA     Nausea     Cough     Cigarette smoking     Essential Tremor         Immunizations:     Prevnar 13 (Pneumococcal PCV 13) 1/15/2018     Fluzone High-Dose pf (>=65 yr) 10/5/2016     Fluzone High-Dose pf (>=65 yr) 9/14/2017     Fluzone High-Dose pf (>=65 yr) 9/27/2018         Allergies:     Last Reviewed on 1/16/2019 10:31 AM by Edel Ferraro      No Known Drug Allergies.         Current Medications:     Last Reviewed on 1/16/2019 10:31 AM by Edel Ferraro    Propranolol HCl 40mg Tablet Take 1 tablet(s) by mouth bid     Cetirizine HCl 10mg Tablet 1 tab daily     Vitamin D3 5,000IU Capsules 1 capsule every day     Primidone 50mg Tablet 1 at bedtime     [facility] Silver Vitamin* vitamin 1 daily     fish oil 1 tab daily     Omeprazole 20mg Capsules, Extended Release 1 capsule daily     Zofran 4mg Tablet 1 po q 6 hours prn         OBJECTIVE:        Vitals:         Current: 1/16/2019 10:35:45 AM    Ht:  5 ft, 8.5 in;  Wt: 170.8 lbs;  BMI: 25.6    T: 98.4 F (oral);  BP: 130/72 mm Hg (left arm, sitting);  P: 82 bpm (left arm (BP Cuff), sitting);  sCr: 0.89 mg/dL;  GFR: 75.38        Exams:     PHYSICAL EXAM:      GENERAL: Vitals recorded well developed, well nourished;  no apparent distress;     NECK: range of motion is normal;     RESPIRATORY: normal appearance and symmetric expansion of chest wall; normal respiratory rate and pattern with no distress; normal breath sounds with no rales, rhonchi, wheezes or rubs;     CARDIOVASCULAR: normal rate; rhythm is regular;     GASTROINTESTINAL: nontender; normal bowel sounds;     LYMPHATIC: no enlargement of cervical or facial nodes; no supraclavicular nodes;     MUSCULOSKELETAL: normal gait; normal range of motion of all major muscle groups; no limb or joint pain with range of motion;     NEUROLOGIC: mental status: alert and oriented x 3; per baseline     PSYCHIATRIC: appropriate affect and demeanor; normal speech pattern; normal thought and perception;         Lab/Test Results:             Glucose, Urine:  Neg (01/16/2019),     Bilirubin, urine:  Negative (01/16/2019),     Ketones, Urine Strip:  Negative (01/16/2019),     Specific Gravity, urine:  1.020 (01/16/2019),     Blood in Urine:  negative (01/16/2019),     pH, urine:  6.5 (01/16/2019),     Protein Urine QL:  negative (01/16/2019),     Urobilinogen, urine:  0.2 E.U./dL (01/16/2019),     Nitrite, Urine:  Negative (01/16/2019),     Leukoctyes, urine:  Negative (01/16/2019),     Appearance:  Slightly Cloudy (01/16/2019),     collection source:  Clean-catch (01/16/2019),     Color:  Dark Yellow (01/16/2019),     Performed by::  KG (01/16/2019),             ASSESSMENT           724.2   M54.5  Low back pain              DDx:     790.93   R97.20  Elevated PSA              DDx:     790.99   R79.9  Other abnormal laboratory result on blood              DDx:     789.09   R10.9  Abdominal pain, other specified site              DDx:         ORDERS:         Radiology/Test Orders:       [id number]  Ultrasound Right Upper Quadrant abdomen limited  (Send-Out)           Lab Orders:       [id number]  Urinalysis, automated, without microscopy   (In-House)         [id number]  [id number] [facility] Prostate-Specific Ag, Serum  (Send-Out)         [id number]  [id number] [facility] Hepatic Function Panel (7)  (Send-Out)         [id number]  [id number] [facility] CBC with Differential/Platelet  (Send-Out)         [id number]  [id number] H. pylori Breath test  (Send-Out)                   PLAN:          Low back pain     LABORATORY:  Labs ordered to be performed today include UA dip in office no micro.            Orders:       [id number]  Urinalysis, automated, without microscopy  (In-House)            Elevated PSA will follow up on this lab     LABORATORY:  Labs ordered to be performed today at [facility] include PSA.            Orders:       [id number]  [id number] [facility] Prostate-Specific Ag, Serum  (Send-Out)            Other abnormal laboratory result on blood     LABORATORY:  Labs ordered to be performed today at [facility] include.  Liver Function Panel           Orders:       [id number]  [id number] [facility]  (Send-Out)            Abdominal pain, other specified site     LABORATORY:  Labs ordered to be performed today at [facility] include CBC and H. pylori breath test.      RADIOLOGY:  I have ordered Abdominal Ultrasound Limited Right Upper Quadrant to be done today.            Orders:       [id number]  Ultrasound Right Upper Quadrant abdomen limited  (Send-Out)         [id number]  [id number] [facility] CBC with Differential/Platelet  (Send-Out)         [id number]  [id number] H. pylori Breath test  (Send-Out)               CHARGE CAPTURE           **Please note: ICD descriptions below are intended for billing purposes only and may not represent clinical diagnoses**        Primary Diagnosis:         724.2 Low back pain            M54.5    Low back pain              Orders:          [id number]   Office/outpatient visit; established patient, level 3  (In-House)             [id number]   Urinalysis, automated, without microscopy  (In-House)           790.93 Elevated PSA            R97.20    Elevated prostate specific antigen [PSA]    790.99 Other  abnormal laboratory result on blood            R79.9    Abnormal finding of blood chemistry, unspecified    789.09 Abdominal pain, other specified site            R10.9    Unspecified abdominal pain        ADDENDUMS:      ____________________________________    Date: 01/18/2019 11:47 AM    Author: Delilah Lorenzo         Visit Note Faxed to:        [facility]ue[facility]  (Gastroenterology); Number [phone]

## 2021-05-18 NOTE — PROGRESS NOTES
Tristan Bolivar 1950     Office/Outpatient Visit    Visit Date: Mon, Magdy 15, 2018 09:29 am    Provider: Ashley Robertson N.P. (Assistant: Sarah Lang MA)    Location: [facility]        Electronically signed by Ashley Robertson N.P. on  01/15/2018 12:49:17 PM                             SUBJECTIVE:        CC: Medicare wellness, get up to date on everything (accompanied by sister)         HPI:         Emir is here for a Medicare wellness visit.  Individual and family medical history was reviewed and updated A list of current providers and suppliers reviewed and updated A current height, weight, BMI, blood pressure, and pulse were recorded in the vitals section of the note and have been reviewed A review of possible cognitive impairment was performed and the following was noted: he is having difficulty driving;  not experiencing changes in memory;  he is having trouble with his finances He does have a history of developmental delay, Sister is the POA and is present with him today   She reports that he is independent at home, just requires a family member checking on him daily for any additional needs A review of functional ability and level of safety was performed and was negative He denies having trouble hearing the TV/radio when others do not, having to strain to hear or understand conversations and wearing hearing aid(s).  Concerning home safety, he reports that at home he DOES have grab bars in the bath and functioning smoke alarms, but not throw rugs, poor lighting or a slippery bath or shower.      Physical Activity: ** Never exercises; Has not had any falls or only one fall without injury in the past year.  Advance Care Directive updated today in [facility] Tobacco: Current Smoker: Currently smokes cigarettes every day.    Preventative Health updated today.      ROS:     CONSTITUTIONAL:  Negative for chills, fatigue and fever.      EYES:  Positive for use of glasses and cataract.      E/N/T:   "Positive for diminished hearing ( \"too much wax\" ).   Negative for sore throat.      CARDIOVASCULAR:  Positive for chest pain ( sister reports on occasion will grab chest and complain of pain ).   Negative for pedal edema.      RESPIRATORY:  Positive for wheezing ( per sister ).   Negative for recent cough or dyspnea.      GASTROINTESTINAL:  Negative for abdominal pain, constipation, diarrhea, nausea and vomiting.      GENITOURINARY:  Negative for dysuria, nocturia and change in urine stream.      MUSCULOSKELETAL:  Positive for joint stiffness (left hip).   Negative for arthralgias or myalgias.      NEUROLOGICAL:  Positive for tremor (better with medication, but still present).   Negative for paresthesias.      ENDOCRINE:  Negative for hair loss, polydipsia and polyphagia.      ALLERGIC/IMMUNOLOGIC:  Negative for seasonal allergies.      PSYCHIATRIC:  Negative for anxiety, crying spells, depression, feelings of stress, anhedonia, sadness, sleep disturbance and suicidal thoughts.          PMH/FMH/SH:     Last Reviewed on 1/15/2018 09:45 AM by Ashley Robertson    Past Medical History:             PAST MEDICAL HISTORY         Peptic Ulcer Disease     Renal Stones         PREVENTIVE HEALTH MAINTENANCE             EVALUATION FOR AORTIC ANEURYSM: has never been done The next one is due  NOW     COLORECTAL CANCER SCREENING: colonoscopy with the following abnormalities noted-- Polyp(s) and recommended 5 year follow up; The next colonoscopy is due  NOW     DENTAL CLEANING: NOW     EYE EXAM: The next one is due  NOW     Hepatitis C Medicare Screening: DUE NOW     PSA: was last done 4/18/12         PAST MEDICAL HISTORY             ADVANCE DIRECTIVE Living will on file         Surgical History:         Fracture Repair: left hip;         Family History:     Father: Medical history unknown     Mother:    Positive for Alzheimer's Disease;     Brother(s):    Positive for Myocardial Infarction;     Sister(s):    Positive for Type 2 " Diabetes;         Social History:     Occupation:    Disabled     Marital Status: Single     Children: None     A.D.L.s:  At his current level of functioning, he can converse in a meaningful manner, dress himself, recognize familiar faces and remember the current date.   He cannot bathe himself, clean the house, control his bladder, control his bowel function, cook meals, drive a car, feed himself, find his way home, live independently, ride public transportation, remember his name or remember where he lives.          Tobacco/Alcohol/Supplements:     Last Reviewed on 1/15/2018 09:45 AM by Ashley Robertson    Tobacco: Current Smoker: He currently smokes every day, 1-1/2 packs per day.  Non-drinker     Caffeine:  He admits to consuming caffeine via coffee ( 1 serving per day ) and tea ( 2 servings per day ).          Substance Abuse History:     Last Reviewed on 1/15/2018 10:35 AM by Ashley Robertson    None         Mental Health History:     Last Reviewed on 1/15/2018 09:45 AM by Ashley Robertson        OTHER (enter)developmental delay         Communicable Diseases (eg STDs):     Last Reviewed on 1/15/2018 09:45 AM by Ashley Robertson    Reportable health conditions; NEGATIVE             Current Problems:     Last Reviewed on 1/15/2018 09:45 AM by Ashley Robertson    Hip pain     GERD     Screening test for viral diseases - other     Screening for colon cancer     Screening for cardiovascular conditions     Cigarette smoking     Screening for prostate cancer     Cerumen impaction     Chest pain, other type     Essential Tremor         Immunizations:     Prevnar 13 (Pneumococcal PCV 13) 1/15/2018     Fluzone High-Dose pf (>=65 yr) 10/5/2016     Fluzone High-Dose pf (>=65 yr) 9/14/2017         Allergies:     Last Reviewed on 1/15/2018 09:45 AM by Ashley Robertson      No Known Drug Allergies.         Current Medications:     Last Reviewed on 1/15/2018 09:45 AM by Ashley Robertson    Centrum Silver Vitamin*  vitamin 1 daily     fish oil 1 tab daily     Propranolol HCl 40mg Tablet Take 1 tablet(s) by mouth bid         OBJECTIVE:        Vitals:         Current: 1/15/2018 9:31:43 AM    Ht:  5 ft, 8.5 in;  Wt: 169.7 lbs;  BMI: 25.4    T: 96.7 F (oral);  BP: 138/90 mm Hg (left arm, sitting);  P: 87 bpm (left arm (BP Cuff), sitting);  sCr: 1.14 mg/dL;  GFR: 59.46    VA: 20/25 OD, 20/25 OS (near, with correction)        Exams:     PHYSICAL EXAM:     GENERAL: Vitals recorded well developed, well nourished;  no apparent distress;     EYES: PERRL, EOMI     E/N/T: EARS: external auditory canal normal bilaterally and occluded by cerumen bilaterally;  bilateral TMs are normal;  NOSE:  normal nasal mucosa, septum, turbinates, and sinuses; OROPHARYNX:  normal mucosa, dentition, gingiva, and posterior pharynx;     NECK: range of motion is normal;     RESPIRATORY: normal appearance and symmetric expansion of chest wall; normal respiratory rate and pattern with no distress; normal breath sounds with no rales, rhonchi, wheezes or rubs;     CARDIOVASCULAR: normal rate; rhythm is regular;  no edema;     GASTROINTESTINAL: nontender; normal bowel sounds; no organomegaly;     LYMPHATIC: no enlargement of cervical or facial nodes; no supraclavicular nodes;     MUSCULOSKELETAL: gait: slowed and unsteady;  normal range of motion of all major muscle groups; pain with range of motion in: left hip;     NEUROLOGIC: mental status: alert and oriented x 3; GROSSLY INTACT     PSYCHIATRIC: appropriate affect and demeanor; normal speech pattern; normal thought and perception; Depression screen is performed and is negative.          Lab/Test Results:         LABORATORY RESULTS: EKG performed by tls         Procedures:     Cerumen impaction         Cerumen/Wax removal: Cerumen impaction is noted in both ears The degree of wax accumulation is moderate.  With minimal difficulty, using a syringe irrigation, the wax is removed.  Removed from ear was hard balls  of wax.  The patient tolerated the procedure well.      There were no complications.  Performed by:tls         Vaccination against streptococcus pneumoniae     1. Prevnar 13 (pneumococcal PCV13): 0.5 ml unit dose given IM in the left upper arm; administered by kh             ASSESSMENT           V70.0   Z00.01  Health checkup              DDx:     786.59   R07.89  Chest pain, other type              DDx:     781.0   R25.1  Essential Tremor              DDx:     380.4   H61.23  Cerumen impaction              DDx:     305.1   F17.200  Cigarette smoking              DDx:     V76.44   Z12.5  Screening for prostate cancer              DDx:     V81.2   Z13.6  Screening for cardiovascular conditions              DDx:     V76.49   Z12.11  Screening for colon cancer              DDx:     V73.89   Z11.59  Screening test for viral diseases - other              DDx:     V03.82   Z23  Vaccination against streptococcus pneumoniae              DDx:     719.45   M25.552  Hip pain              DDx:         ORDERS:         Radiology/Test Orders:       04704  Electrocardiogram, routine with at least 12 leads; with interpretation and report  (In-House)         76389  US abdominal aorta real time screen study AAA  (Send-Out)           Aorta Screening Ultrasound Medicare  (Send-Out)         31096  Chest x-ray, two views, frontal and lateral  (Send-Out)         [id number]  Left radiologic exam, hip, unilateral; complete, minimum of two views  (Send-Out)           Lab Orders:       [id number]  [id number] [facility] CBC without diff  (Send-Out)         06353  [id number] [facility] Comp Metabolic Panel (14)  (Send-Out)         83228  [id number] [facility] Prostate-Specific Ag, Serum  (Send-Out)         47378  [id number] [facility] Hepatitic C (HCV) Antibody Medicare screen  (Send-Out)           Procedures Ordered:         Annual wellness visit, includes a PPPS, subsequent visit  (In-House)         94046GZ  Removal of impacted cerumen right ear (NURSE)  (In-House)          REFER  Referral to Specialist or Other Facility  (Send-Out)         REFER  Referral to Specialist or Other Facility  (Send-Out)         15057  Pneumococcal conjugate vaccine, 13 valent, for intramuscular use  (In-House)           Other Orders:         Negative EtOH screen  (In-House)         1101F  Pt screen for fall risk; document no falls in past year or only 1 fall w/o injury in past year (STEVIE)  (In-House)         51520  Behav assmt w/score & docd/stand instrument  (In-House)         4004F  Pt scrnd tobacco use rcvd tobacco cessation talk  (In-House)           Depression screen negative  (In-House)         [id number]  Removal of impacted cerumen left ear (NURSE)  (In-House)           Administration of pneumococcal vaccine (x1)                 PLAN:          Health checkup Needs an appt late in the day - Monday preferably - reevaluate tremors     LABORATORY:  Labs ordered to be performed today at [facility] include CBC W/O DIFF.  CMP           Orders:         Negative EtOH screen  (In-House)         1101F  Pt screen for fall risk; document no falls in past year or only 1 fall w/o injury in past year (STEVIE)  (In-House)         24052  Behav assmt w/score & docd/stand instrument  (In-House)           Annual wellness visit, includes a PPPS, subsequent visit  (In-House)           Depression screen negative  (In-House)         78426  [id number] [facility] CBC without diff  (Send-Out)         81646  [id number] [facility] Comp Metabolic Panel (14)  (Send-Out)            Chest pain, other type         TESTS/PROCEDURES:  Will proceed with an ECG to be performed/scheduled now.      RECOMMENDATIONS given include: EKG looks ok today - will forwared to Dr. Dunn for her review and recommendations if any.      FOLLOW-UP: Schedule follow-up appointments on a p.r.n. basis..            Orders:       28594  Electrocardiogram, routine with at least 12 leads; with interpretation and report  (In-House)             Essential Tremor Late After noon         REFERRALS:  Referral initiated to a neurologist ( Dr. Rosa Callahan; for evaluation of Tremors ).            Orders:       REFER  Referral to Specialist or Other Facility  (Send-Out)            Cerumen impaction           Orders:       [id number]  Removal of impacted cerumen left ear (NURSE)  (In-House)         [id number]  Removal of impacted cerumen right ear (NURSE)  (In-House)            Cigarette smoking         RADIOLOGY:  I have ordered a chest x-ray (PA and lateral) to be done today.            Orders:         Aorta Screening Ultrasound Medicare  (Send-Out)         92408  Chest x-ray, two views, frontal and lateral  (Send-Out)         4004F  Pt scrnd tobacco use rcvd tobacco cessation talk  (In-House)            Screening for prostate cancer     LABORATORY:  Labs ordered to be performed today at [facility] include PSA.            Orders:       12825  [id number] [facility] Serum  (Send[facility])            Screening for cardiovascular conditions           Orders:       00173   abdominal aorta real time screen study AAA  (Send-Out)            Screening for colon cancer         REFERRALS:  Referral initiated to a general surgeon ( a colonoscopy ).            Orders:       REFER  Referral to Specialist or Other Facility  (Send-Out)            Screening test for viral diseases - other     LABORATORY:  Labs ordered to be performed today at [facility] include Hepatitis C Screening.            Orders:       20722  [id number] [facility] Hepatitic [facility] (HCV) Antibody Medicare screen  (Send-Out)            Vaccination against streptococcus pneumoniae         IMMUNIZATIONS given today: Prevnar 13.            Orders:       28248  Pneumococcal conjugate vaccine, 13 valent, for intramuscular use  (In-House)                     Administration of pneumococcal vaccine (x1)          Hip pain         FOLLOW-UP TESTING #1:    FOLLOW-UP RADIOLOGY:  The patient has been instructed to  schedule a left hip x-ray           Orders:       [id number]  Left radiologic exam, hip, unilateral; complete, minimum of two views  (Send-Out)               Patient Recommendations:        For  Chest pain, other type:     I also recommend EKG looks ok today - will forwared to Dr. Dunn for her review and recommendations if any.  Schedule follow-up appointments as needed.                APPOINTMENT INFORMATION:        Monday Tuesday Wednesday Thursday Friday Saturday Sunday            Time:___________________AM  PM   Date:_____________________             CHARGE CAPTURE           **Please note: ICD descriptions below are intended for billing purposes only and may not represent clinical diagnoses**        Primary Diagnosis:         V70.0 Health checkup            Z00.01    Encounter for general adult medical examination with abnormal findings              Orders:          43577   Preventive medicine, established patient, age 65+ years  (In-House)                Negative EtOH screen  (In-House)             1101F   Pt screen for fall risk; document no falls in past year or only 1 fall w/o injury in past year (STEVIE)  (In-House)             56285   Behav assmt w/score & docd/stand instrument  (In-House)                Annual wellness visit, includes a PPPS, subsequent visit  (In-House)                Depression screen negative  (In-House)           786.59 Chest pain, other type            R07.89    Other chest pain              Orders:          51899 -25  Office/outpatient visit; established patient, level 4  (In-House)             17035   Electrocardiogram, routine with at least 12 leads; with interpretation and report  (In-House)           781.0 Essential Tremor            R25.1    Tremor, unspecified    380.4 Cerumen impaction            H61.23    Impacted cerumen, bilateral              Orders:          03790CB   Removal of impacted cerumen left ear (NURSE)  (In-House)             56659RO   Removal  of impacted cerumen right ear (NURSE)  (In-House)           305.1 Cigarette smoking            F17.200    Nicotine dependence, unspecified, uncomplicated              Orders:          4004F   Pt scrnd tobacco use rcvd tobacco cessation talk  (In-House)           V76.44 Screening for prostate cancer            Z12.5    Encounter for screening for malignant neoplasm of prostate    V81.2 Screening for cardiovascular conditions            Z13.6    Encounter for screening for cardiovascular disorders    V76.49 Screening for colon cancer            Z12.11    Encounter for screening for malignant neoplasm of colon    V73.89 Screening test for viral diseases - other            Z11.59    Encounter for screening for other viral diseases    V03.82 Vaccination against streptococcus pneumoniae            Z23    Encounter for immunization              Orders:          91081   Pneumococcal conjugate vaccine, 13 valent, for intramuscular use  (In-House)                                           Administration of pneumococcal vaccine (x1)         719.45 Hip pain            M25.552    Pain in left hip        ADDENDUMS:      ____________________________________    Date: 01/18/2018 12:40 PM    Author: Rosie Munroe         Visit Note Faxed to:        Rosa Callahan  (Neurology); Number [phone]     Health Summary Faxed to:        Rosa Callahan  (Neurology); Number [phone]            Date: 01/18/2018 12:48 PM    Author: Rosie Munroe         Visit Note Faxed to:        Anup Guidry  (General Surgery); Number [phone]     Health Summary Faxed to:        Anup Guidry  (General Surgery); Number [phone]

## 2021-05-18 NOTE — PROGRESS NOTES
Tristan Bolivar 1950     Office/Outpatient Visit    Visit Date: Wed, Jul 25, 2018 08:39 am    Provider: Ashley Robertson N.P. (Assistant: Diane Wilkins RN)    Location: [facility]        Electronically signed by Ashley Robertson N.P. on  07/25/2018 10:22:46 AM                             SUBJECTIVE:        CC:     Emir is a 68 year old White male.  This is a follow-up visit.  on his blood work         HPI:         Emir desires help with smoking cessation.  He reports smoking 1 pack a day.  He has smoked for.  He smokes the most at home.          Complaint of elevated prostate specific antigen (PSA)..  The symptom began one year ago.  The severity is of moderate intensity.  Prior work-up has included labwork ( Abnormal results: elevated to 5.5 - Cipro given  - decreased to 4.6  then repeat 4.9 ).      ROS:     CONSTITUTIONAL:  Negative for chills, fatigue, fever and weight change.      E/N/T:  Positive for diminished hearing, frequent rhinorrhea and sinus pressure.   Negative for nasal congestion.      CARDIOVASCULAR:  Negative for chest pain, orthopnea, paroxysmal nocturnal dyspnea and pedal edema.      RESPIRATORY:  Negative for dyspnea and cough.      GENITOURINARY:  Negative for change in urine stream.      ALLERGIC/IMMUNOLOGIC:  Positive for seasonal allergies.      PSYCHIATRIC:  Negative for anxiety and depression.          PMH/FMH/SH:     Last Reviewed on 1/15/2018 09:45 AM by Ashley Robertson    Past Medical History:             PAST MEDICAL HISTORY         Peptic Ulcer Disease     Renal Stones         PREVENTIVE HEALTH MAINTENANCE             EVALUATION FOR AORTIC ANEURYSM: has never been done The next one is due  NOW     COLORECTAL CANCER SCREENING: Up to date (colonoscopy q10y; sigmoidoscopy q5y; Cologuard q3y) was last done 3/9/2018, Results are in chart; colonoscopy with the following abnormalities noted-- tubular adenoma; The next colonoscopy is due  03/2021     DENTAL  CLEANING: NOW     EYE EXAM: The next one is due  NOW     Hepatitis C Medicare Screening: DUE NOW     PSA: was last done 4/18/12         PAST MEDICAL HISTORY             ADVANCE DIRECTIVE Living will on file         Surgical History:         Fracture Repair: left hip;         Family History:     Father: Medical history unknown     Mother:    Positive for Alzheimer's Disease;     Brother(s):    Positive for Myocardial Infarction;     Sister(s):    Positive for Type 2 Diabetes;         Social History:     Occupation:    Disabled     Marital Status: Single     Children: None     A.D.L.s:  At his current level of functioning, he can converse in a meaningful manner, dress himself, recognize familiar faces and remember the current date.   He cannot bathe himself, clean the house, control his bladder, control his bowel function, cook meals, drive a car, feed himself, find his way home, live independently, ride public transportation, remember his name or remember where he lives.          Tobacco/Alcohol/Supplements:     Last Reviewed on 7/25/2018 08:43 AM by Diane Wilkins    Tobacco: Current Smoker: He currently smokes every day, 1-1/2 packs per day.  Non-drinker     Caffeine:  He admits to consuming caffeine via coffee ( 1 serving per day ) and tea ( 2 servings per day ).          Substance Abuse History:     Last Reviewed on 1/15/2018 10:35 AM by Ashley Robertson    None         Mental Health History:     Last Reviewed on 1/15/2018 09:45 AM by Ashley Robertson        OTHER (enter)developmental delay         Communicable Diseases (eg STDs):     Last Reviewed on 1/15/2018 09:45 AM by Ashley Robertson    Reportable health conditions; NEGATIVE             Current Problems:     Last Reviewed on 1/15/2018 09:45 AM by Ashley Robertson    Hip pain     GERD     Allergic rhinitis, unspecified cause     Cerumen impaction     Elevated prostate specific antigen (PSA)     Cigarette smoking     Essential Tremor          Immunizations:     Prevnar 13 (Pneumococcal PCV 13) 1/15/2018     Fluzone High-Dose pf (>=65 yr) 10/5/2016     Fluzone High-Dose pf (>=65 yr) 9/14/2017         Allergies:     Last Reviewed on 7/25/2018 08:43 AM by Diane Wilkins      No Known Drug Allergies.         Current Medications:     Last Reviewed on 7/25/2018 08:46 AM by Diane Wilkins    Propranolol HCl 40mg Tablet Take 1 tablet(s) by mouth bid     Primidone 50mg Tablet 1 at bedtime     [facility] Silver Vitamin* vitamin 1 daily     fish oil 1 tab daily     Vitamin D3 5,000IU Capsules 1 capsule every day         OBJECTIVE:        Vitals:         Current: 7/25/2018 8:43:08 AM    Ht:  5 ft, 8.5 in;  Wt: 171 lbs;  BMI: 25.6    T: 97.1 F (oral);  BP: 134/78 mm Hg (left arm, sitting);  P: 99 bpm (left arm (BP Cuff), sitting);  sCr: 0.94 mg/dL;  GFR: 71.40        Exams:     PHYSICAL EXAM:     GENERAL: Vitals recorded well developed, well nourished;  well groomed;  no apparent distress;     E/N/T: EARS: external auditory canal occluded by cerumen bilaterally;  OROPHARYNX:  normal mucosa, dentition, gingiva, and posterior pharynx;     NECK:  supple, full ROM; no thyromegaly; no carotid bruits;     RESPIRATORY: normal respiratory rate and pattern with no distress; normal breath sounds with no rales, rhonchi, wheezes or rubs;     CARDIOVASCULAR: normal rate; rhythm is regular;  normal S1; normal S2; no systolic murmur; no cyanosis; no edema;     GASTROINTESTINAL: nontender, nondistended; no hepatosplenomegaly or masses; no bruits;     LYMPHATIC: bilateral submandibular nodes ( nontender );     MUSCULOSKELETAL:  Normal range of motion, strength and tone;     NEUROLOGIC: baseline;     PSYCHIATRIC:  appropriate affect and demeanor; normal speech pattern; grossly normal memory;         Procedures:     Cerumen impaction         Cerumen/Wax removal: Cerumen impaction is noted in both ears The degree of wax accumulation is moderate in the left ear and right  ear.  With minimal difficulty, using a syringe irrigation, the wax is removed.  Removed from ear was hard balls of wax.      There were no complications.  Performed by: emma             ASSESSMENT:           305.1   F17.200  Cigarette smoking              DDx:     790.93   R97.20  Elevated prostate specific antigen (PSA)              DDx:     380.4   H61.23  Cerumen impaction              DDx:     477.9   J30.9  Allergic rhinitis, unspecified cause              DDx:         ORDERS:         Meds Prescribed:       Wellbutrin SR (Bupropion HCl) 150mg Tablets, Sustained Release Take 1 tablet PO daily for 2 weeks, if tolerated increase to BID  #60 (Sixty) tablet(s) Refills: 1       Cetirizine HCl 10mg Tablet 1 tab daily  #30 (Thirty) tablet(s) Refills: 2         Lab Orders:       FUTURE  Future order to be done at patients convenience  (Send-Out)         *  [employer] Medicare screening PSA  (Send-Out)  (Follow up in 6 months)       APPTO  Appointment need  (In-House)           Procedures Ordered:       [id number]  Removal of impacted cerumen right ear (NURSE)  (In-House)           Other Orders:       [id number]  Removal of impacted cerumen left ear (NURSE)  (In-House)                   PLAN:          Cigarette smoking discussed need to quit smoking - will try wellbutrin - He did fail patches in the past- as he cntinued to smoke with them .  Will have him follow up in 6 weeks to see how he is doing         FOLLOW-UP: Schedule a follow-up appointment in 6 weeks..            Prescriptions:       Wellbutrin SR (Bupropion HCl) 150mg Tablets, Sustained Release Take 1 tablet PO daily for 2 weeks, if tolerated increase to BID  #60 (Sixty) tablet(s) Refills: 1           Orders:       APPTO  Appointment need  (In-House)            Elevated prostate specific antigen (PSA) Discussed close watching  - with follow up in 6 months - Sister declines Urology appt at this time  will recheck in 6 months         FOLLOW-UP TESTING #1: FOLLOW-UP  LABORATORY:  Labs to be scheduled in the future include PSA.            Orders:       FUTURE  Future order to be done at patients convenience  (Send-Out)         *  [employer] Medicare screening PSA  (Send-Out)  (Follow up in 6 months)          Cerumen impaction           Orders:       [id number]  Removal of impacted cerumen left ear (NURSE)  (In-House)         [id number]  Removal of impacted cerumen right ear (NURSE)  (In-House)            Allergic rhinitis, unspecified cause           Prescriptions:       Cetirizine HCl 10mg Tablet 1 tab daily  #30 (Thirty) tablet(s) Refills: 2           Patient Education Handouts:       Allergies              Patient Recommendations:        For  Cigarette smoking:     Schedule a follow-up visit in 6 weeks.                APPOINTMENT INFORMATION:        Monday Tuesday Wednesday Thursday Friday Saturday Sunday            Time:___________________AM  PM   Date:_____________________         For  Elevated prostate specific antigen (PSA):             The following laboratory testing has been ordered:             CHARGE CAPTURE:           Primary Diagnosis:     305.1 Cigarette smoking            F17.200    Nicotine dependence, unspecified, uncomplicated              Orders:          [id number]   Office/outpatient visit; established patient, level 3  (In-House)             APPTO   Appointment need  (In-House)           790.93 Elevated prostate specific antigen (PSA)            R97.20    Elevated prostate specific antigen [PSA]    380.4 Cerumen impaction            H61.23    Impacted cerumen, bilateral              Orders:          [id number]   Removal of impacted cerumen left ear (NURSE)  (In-House)             [id number]   Removal of impacted cerumen right ear (NURSE)  (In-House)           477.9 Allergic rhinitis, unspecified cause            J30.9    Allergic rhinitis, unspecified

## 2021-05-18 NOTE — PROGRESS NOTES
"Tristan Bolivar  1950     Office/Outpatient Visit    Visit Date: Thu, Jun 18, 2020 12:43 pm    Provider: Anupam Skinner MD (Assistant: Edel Ferraro, )    Location: [facility]        Electronically signed by Anupam Skinner MD on  06/18/2020 01:38:59 PM                             Subjective:        CC: Emir is a 69 year old White male.  dry cough, congestion         HPI: He has intellectual disability and his sister accompanies him today and supplements his history.       \" I can't stop coughing and I got a runny nose.\"  \"It might be them cigarettes.\"Has some yellow sputum and nasal drainage. No fever.  No known exposures.  Stays home most to the time, but on Wednesdays goes to a [employer] and they bring food out to the car for him. Cousins take him to the [employer]. Cousins are \"getting out.\"Appetite is good.       He is a smoker.  About a pack of cigarettes per day. We discuss the importance of cessation and I assured him he could quit if he really worked at it, but it wouldn't be easy, it would take commitment. We discussed some of the ways we could help.  He has tried wellbutrin and nicotine gum in the past.     ROS:     CONSTITUTIONAL:  Negative for chills, fatigue and fever.      CARDIOVASCULAR:  Negative for chest pain, orthopnea, paroxysmal nocturnal dyspnea and pedal edema.      RESPIRATORY:  Positive for cough.   Negative for dyspnea.      GASTROINTESTINAL:  Positive for nausea and vomiting.      GENITOURINARY:  Negative for dysuria and polyuria.      PSYCHIATRIC:  Negative for anxiety and depression.          Past Medical History / Family History / Social History:         Last Reviewed on 6/18/2020 12:59 PM by Anupam Skinner    Past Medical History:             PAST MEDICAL HISTORY         Peptic Ulcer Disease     Renal Stones         PREVENTIVE HEALTH MAINTENANCE             EVALUATION FOR AORTIC ANEURYSM: has never been done The next one is due  NOW     COLORECTAL " CANCER SCREENING: Up to date (colonoscopy q10y; sigmoidoscopy q5y; Cologuard q3y) was last done 3/9/2018, Results are in chart; colonoscopy with the following abnormalities noted-- tubular adenoma; The next colonoscopy is due  03/2021     DENTAL CLEANING: NOW     EYE EXAM: The next one is due  NOW     Hepatitis C Medicare Screening: DUE NOW     PSA: was last done 4/18/12         PAST MEDICAL HISTORY             ADVANCE DIRECTIVE Living will on file         PAST MEDICAL HISTORY         Positive for    Renal Stones: dx'd in 12/2018; he has undergone ureteral stent placement; ;         Surgical History:         Fracture Repair: left hip;         Family History:     Father: Medical history unknown     Mother:    Positive for Alzheimer's Disease;     Brother(s):    Positive for Myocardial Infarction;     Sister(s):    Positive for Type 2 Diabetes;         Social History:     Occupation:    Disabled     Marital Status: Single     Children: None     A.D.L.s:  At his current level of functioning, he can bathe himself, control his bladder, control his bowel function, converse in a meaningful manner, dress himself, feed himself, live independently, recognize familiar faces and remember his name.   He cannot clean the house, cook meals, drive a car, remember where he lives or remember the current date.          Tobacco/Alcohol/Supplements:     Last Reviewed on 6/18/2020 12:43 PM by Edel Ferraro    Tobacco: Current Smoker: He currently smokes every day, 1 pack per day.  Non-drinker     Caffeine:  He admits to consuming caffeine via coffee ( 1 serving per day ) and tea ( 2 servings per day ).          Substance Abuse History:     Last Reviewed on 1/15/2018 10:35 AM by Ashley Robertson    None         Mental Health History:     Last Reviewed on 1/15/2018 09:45 AM by Ashley Robertson        OTHER (enter)developmental delay         Communicable Diseases (eg STDs):     Last Reviewed on 1/15/2018 09:45 AM by Ashley Robertson     "Reportable health conditions; NEGATIVE         Allergies:     Last Reviewed on 6/18/2020 12:43 PM by Edel Ferraro    No Known Allergies.        Current Medications:     Last Reviewed on 6/18/2020 12:43 PM by Edel Ferraro    [facility] Silver Vitamin* vitamin [1 daily]    fish oil 1 tab daily    Propranolol HCl 40mg Tablet [Take 1 tablet(s) by mouth bid]    Primidone 50mg Tablet [1 at bedtime]    Vitamin D3 5,000IU Capsules [1 capsule every day]        Objective:        Vitals:         Current: 6/18/2020 12:49:22 PM    Ht:  5 ft, 8.5 in;  Wt: 148.6 lbs;  BMI: 22.3T: 97.3 F (oral);  BP: 147/96 mm Hg (left arm, sitting);  P: 108 bpm (left arm (BP Cuff), sitting);  sCr: 0.89 mg/dL;  GFR: 70.10O2 Sat: 93 % (room air)        Repeat:     12:51:26 PM  BP:   141/77mm Hg (right arm, sitting) 12:51:43 PM  P:   106bpm (right arm (BP Cuff), sitting)     Exams:     PHYSICAL EXAM:     GENERAL:  well developed and nourished; appropriately groomed; in no apparent distress;     EYES: Nonicteric and with unremarkable lids, iris and pupils;     E/N/T: EARS: external auditory canal occluded by cerumen bilateral partially;  OROPHARYNX:  normal mucosa, dentition, gingiva, and posterior pharynx;     NECK: carotid exam reveals no bruits;     RESPIRATORY: prolonged expiration; no rales (\"crackles\") present; (+) expiratory wheezes; at end expiration, mild     CARDIOVASCULAR: normal rate; rhythm is regular;  no systolic murmur;     LYMPHATIC: no enlargement of cervical or facial nodes;     MUSCULOSKELETAL: spine: kyphosis;  No pedal edema.;     NEUROLOGIC: No lateralizing deficit.; some tremor.     PSYCHIATRIC: no hallucination or delusion.  Has moderate intellectual disability;         Assessment:         J20.9   Acute bronchitis, unspecified       F71   Moderate intellectual disabilities       R25.1   Tremor, unspecified       F17.210   Nicotine dependence, cigarettes, uncomplicated           ORDERS:         Meds Prescribed:       [New Rx] " Zithromax Z-Martínez 250 mg oral tablet [take 2 initially then 1 tablet (250 mg) by oral route once daily], #6 (six) tablets, Refills: 0 (zero)         Radiology/Test Orders:       [id number]  COVID 19 Testing  (Send-Out)              Other Orders:       [id number]  Noninvasive ear or pulse oximetry for oxygen saturation; single determination  (In-House)              Smoking and Tobacco Cessation 3 to 10 minutes  (In-House)                      Plan:         Acute bronchitis, unspecifiedWill check for covid but my suspicion is low.  Will go ahead and treat him with round of abx for bronchitis. If not getting better he should follow up as a CXR would be in order.  If gets severe soa, cp, or cyanosis he should go straight to the ER.     LABORATORY:  Labs ordered to be performed today include COVID 19 Testing.            Prescriptions:       [New Rx] Zithromax Z-Martínez 250 mg oral tablet [take 2 initially then 1 tablet (250 mg) by oral route once daily], #6 (six) tablets, Refills: 0 (zero)           Orders:       [id number]  Noninvasive ear or pulse oximetry for oxygen saturation; single determination  (In-House)            [id number]  COVID 19 Testing  (Send-Out)              Moderate intellectual disabilitiescontinue supportive care with exposure precautions        Nicotine dependence, cigarettes, uncomplicatedcounseling about cessation x 5 min          Orders:         Smoking and Tobacco Cessation 3 to 10 minutes  (In-House)                  Charge Capture:         Primary Diagnosis:     J20.9  Acute bronchitis, unspecified           Orders:      [id number]  Office/outpatient visit; established patient, level 3  (In-House)            [id number]  Noninvasive ear or pulse oximetry for oxygen saturation; single determination  (In-House)              F71  Moderate intellectual disabilities     R25.1  Tremor, unspecified     F17.210  Nicotine dependence, cigarettes, uncomplicated           Orders:        Smoking and Tobacco Cessation 3 to 10  minutes  (In-House)

## 2021-05-18 NOTE — PROGRESS NOTES
Tristan Bolivar  1950     Office/Outpatient Visit    Visit Date: Fri, Mar 19, 2021 11:02 am    Provider: Ashley Robertson N.P. (Assistant: Lynn Funez MA)    Location: [facility]        Electronically signed by Ashley Robertson N.P. on  03/23/2021 09:06:09 PM                             Subjective:        CC: Emir is a 70 year old White male.  This is a follow-up visit.  accompainied by 2 sisters - Bethany is his primary caregiver        HPI:           Complaint of polyp of colon..  History of polyp of the colon  has not followed up with repeat colonoscopy as recommended           Complaint of elevated prostate specific antigen [PSA]..  Emir had a history of elevated PSA  he has not had a repeat PSA in a few years.            Complaint of nicotine dependence, cigarettes, uncomplicated..  The symptom began 50 years years ago.  Emir is reporting not interested in quitting cigarettes.  His sisters would like for him to have something to try to help him quit smoking.            Complaint of jansen's esophagus with low grade dysplasia..  History of Barretts esophagus - no longer taking omeprazole and has not for some time.  Bethany reports she does not know when he stopped taking this           Complaint of tremor, unspecified..  Was previously seen by Rosa Callahan but has not seen her in some time .  The sisters are concerned that his tremors are getting worse.  Would like to get back in with neurologist for further evaluation .  Bethany reports that he does have intention tremors and not noticing resting .  He often picks up his food with fork, and is almost unable to get the food to his mouth due to the tremors He is on propranolol and Bethany thinks that he takes it on a regular basis    ROS: (answers yes and no questions only - unsure if answers are accurate per sister)    CONSTITUTIONAL:  Negative for chills, fatigue and fever.      CARDIOVASCULAR:  Negative for chest pain and pedal  edema.      RESPIRATORY:  Positive for recent cough and pleuritic chest pain.   Negative for dyspnea.      NEUROLOGICAL:  Negative for headaches and paresthesias.      ALLERGIC/IMMUNOLOGIC:  Positive for seasonal allergies to nose always running.          Past Medical History / Family History / Social History:         Last Reviewed on 3/19/2021 11:31 AM by Ashley Robertson    Past Medical History:             PAST MEDICAL HISTORY         Peptic Ulcer Disease     Renal Stones         PREVENTIVE HEALTH MAINTENANCE             EVALUATION FOR AORTIC ANEURYSM: has never been done The next one is due  NOW     COLORECTAL CANCER SCREENING: Up to date (colonoscopy q10y; sigmoidoscopy q5y; Cologuard q3y) was last done 3/9/2018, Results are in chart; colonoscopy with the following abnormalities noted-- tubular adenoma; The next colonoscopy is due  03/2021     DENTAL CLEANING: NOW     EYE EXAM: The next one is due  NOW     Hepatitis C Medicare Screening: DUE NOW     PSA: was last done 4/18/12         PAST MEDICAL HISTORY             ADVANCE DIRECTIVE Living will on file         PAST MEDICAL HISTORY         Positive for    Renal Stones: dx'd in 12/2018; he has undergone ureteral stent placement; ;         Surgical History:         Fracture Repair: left hip;         Family History:     Father: Medical history unknown     Mother:    Positive for Alzheimer's Disease;     Brother(s):    Positive for Myocardial Infarction;     Sister(s):    Positive for Type 2 Diabetes;         Social History:     Occupation:    Disabled     Marital Status: Single     Children: None     A.D.L.s:  At his current level of functioning, he can bathe himself, control his bladder, control his bowel function, converse in a meaningful manner, dress himself, feed himself, live independently, recognize familiar faces and remember his name.   He cannot clean the house, cook meals, drive a car, remember where he lives or remember the current date.           Tobacco/Alcohol/Supplements:     Last Reviewed on 3/19/2021 11:31 AM by Ashley Robertson    Tobacco: Current Smoker: He currently smokes every day, 2 packs per day; (40+ pack-year history).  Non-drinker     Caffeine:  He admits to consuming caffeine via coffee ( 1 serving per day ) and tea ( 2 servings per day ).          Substance Abuse History:     Last Reviewed on 3/19/2021 11:31 AM by Ashley Robertson    None         Mental Health History:     Last Reviewed on 3/19/2021 11:31 AM by Ashley Robertson        OTHER (enter)developmental delay         Communicable Diseases (eg STDs):     Last Reviewed on 3/19/2021 11:31 AM by Ashley Robertson    Reportable health conditions; NEGATIVE         Current Problems:     Last Reviewed on 3/19/2021 11:31 AM by Ashley Robertson    Gastro-esophageal reflux disease without esophagitis    Tremor, unspecified    Nicotine dependence, unspecified, uncomplicated    Calculus of kidney    Low back pain    Nicotine dependence, cigarettes, uncomplicated    Moderate intellectual disabilities    Allergic rhinitis, unspecified    Rock's esophagus with low grade dysplasia    Polyp of colon    Elevated prostate specific antigen [PSA]    Encounter for screening for malignant neoplasm of colon    Encounter for screening for malignant neoplasm of prostate    Encounter for screening for cardiovascular disorders        Immunizations:     influenza, high-dose, quadrivalent (FLUZONE HIGH-DOSE QUAD 2020-21) 10/8/2020    Prevnar 13 (Pneumococcal PCV 13) 1/15/2018    Fluzone High-Dose pf (>=65 yr) 10/5/2016    Fluzone High-Dose pf (>=65 yr) 9/14/2017    Fluzone High-Dose pf (>=65 yr) 9/27/2018        Allergies:     Last Reviewed on 3/19/2021 11:31 AM by Ashley Robertson    No Known Allergies.        Current Medications:     Last Reviewed on 3/19/2021 11:31 AM by Ashley Robertson    Centrum Silver Vitamin* vitamin [1 daily]    fish oil 1 tab daily     Propranolol HCl 40mg Tablet [Take 1  tablet(s) by mouth bid]    Primidone 50 mg oral tablet [1 at bedtime]    cholecalciferol (vitamin D3) 125 mcg (5,000 unit) oral capsule [1 capsule every day]        Objective:        Vitals:         Current: 3/19/2021 11:09:51 AM    Ht:  5 ft, 8.5 in;  Wt: 158.4 lbs;  BMI: 23.7T: 97.2 F (temporal);  BP: 156/87 mm Hg (right arm, sitting);  P: 89 bpm (right arm (BP Cuff), sitting);  sCr: 0.89 mg/dL;  GFR: 71.07        Repeat:     11:11:2 AM  BP:   145/82mm Hg (right arm, sitting, P-86)     Exams:     PHYSICAL EXAM:     GENERAL: Vitals recorded well developed, well nourished;  no apparent distress;     NECK: range of motion is normal;     RESPIRATORY: normal appearance and symmetric expansion of chest wall; normal respiratory rate and pattern with no distress; expiratory wheezes in the clears with cough;     CARDIOVASCULAR: normal rate; rhythm is regular;  no edema;     GASTROINTESTINAL: nontender; normal bowel sounds; no organomegaly;     LYMPHATIC: no enlargement of cervical or facial nodes; no supraclavicular nodes;     NEUROLOGIC: mental status: alert;  intention tremor noted;     PSYCHIATRIC: appropriate affect and demeanor; normal speech pattern; normal thought and perception;         Assessment:         K63.5   Polyp of colon       R97.20   Elevated prostate specific antigen [PSA]       K21.9   Gastro-esophageal reflux disease without esophagitis       F17.210   Nicotine dependence, cigarettes, uncomplicated       K22.710   Rock's esophagus with low grade dysplasia       R25.1   Tremor, unspecified       Z12.5   Encounter for screening for malignant neoplasm of prostate       Z12.11   Encounter for screening for malignant neoplasm of colon       Z13.6   Encounter for screening for cardiovascular disorders       J30.9   Allergic rhinitis, unspecified           ORDERS:         Meds Prescribed:       [Queued New Rx] Wellbutrin  mg oral tablet, sustained-release 12 hr [take 1 tablet (150 mg) by oral route 2  times per day], #60 (sixty) tablets, Refills: 0 (zero)       [Queued New Rx] omeprazole 40 mg oral capsule,delayed release (enteric coated) [take 1 capsule (40 mg) by oral route daily], #30 (thirty) capsules, Refills: 5 (five)       [Queued New Rx] cetirizine 10 mg oral tablet [take 1 tablet (10 mg) by oral route once daily], #30 (thirty) tablets, Refills: 11 (eleven)         Lab Orders:       FUTURE  Future order to be done at patients convenience  (Send-Out)            [id number]  [id number] [facility] Prostate-Specific Ag, Serum  (Send-Out)            FUTURE  Future order to be done at patients convenience  (Send-Out)            [id number]  [id number] [facility] CBC without diff  (Send-Out)            [id number]  [id number] [facility] Comp Metabolic Panel (14)  (Send-Out)            [id number]  [id number] [facility] Lipid Panel with reflex  (Send-Out)              Procedures Ordered:       REFER  Referral to Specialist or Other Facility  (Send-Out)            REFER  Referral to Specialist or Other Facility  (Send-Out)                      Plan:         Polyp of colon        REFERRALS:  Referral initiated to a general surgeon ( for evaluation of follow up polyp; a colonoscopy ).            Orders:       REFER  Referral to Specialist or Other Facility  (Send-Out)              Elevated prostate specific antigen [PSA]due for PSA check        Gastro-esophageal reflux disease without esophagitisHighly recommend that he get back on the omeprazole daily as he does have Barretts esophagus and will require lifelong treatment        Nicotine dependence, cigarettes, uncomplicated          Prescriptions:       [Queued New Rx] Wellbutrin  mg oral tablet, sustained-release 12 hr [take 1 tablet (150 mg) by oral route 2 times per day], #60 (sixty) tablets, Refills: 0 (zero)         Rock's esophagus with low grade dysplasia          Prescriptions:       [Queued New Rx] omeprazole 40 mg oral capsule,delayed release (enteric coated) [take 1 capsule (40 mg) by oral route  daily], #30 (thirty) capsules, Refills: 5 (five)         Tremor, unspecified        REFERRALS:  Referral initiated to a neurologist ( Dr. Ron Torres [facility] ).            Orders:       REFER  Referral to Specialist or Other Facility  (Send-Out)              Encounter for screening for malignant neoplasm of prostate        FOLLOW-UP TESTING #1: FOLLOW-UP LABORATORY:  Labs to be scheduled in the future include PSA.            Orders:       FUTURE  Future order to be done at patients convenience  (Send-Out)            [id number]  [id number] [facility] Prostate-Specific Ag, Serum  (Send-Out)              Encounter for screening for malignant neoplasm of colonreferal for colonoscopy and EGD sent         Encounter for screening for cardiovascular disorders        FOLLOW-UP TESTING #1: FOLLOW-UP LABORATORY:  Labs to be scheduled in the future include CBC W/O DIFF.  CMP Lipid panel           Orders:       FUTURE  Future order to be done at patients convenience  (Send-Out)            [id number]  [id number] [facility] CBC without diff  (Send-Out)            [id number]  [id number] [facility] Comp Metabolic Panel (14)  (Send-Out)            [id number]  [id number] [facility] Lipid Panel with reflex  (Send-Out)              Allergic rhinitis, unspecified          Prescriptions:       [Queued New Rx] cetirizine 10 mg oral tablet [take 1 tablet (10 mg) by oral route once daily], #30 (thirty) tablets, Refills: 11 (eleven)             Charge Capture:         Primary Diagnosis:     K63.5  Polyp of colon           Orders:      89909  Office/outpatient visit; established patient, level 4  (In-House)              R97.20  Elevated prostate specific antigen [PSA]     K21.9  Gastro-esophageal reflux disease without esophagitis     F17.210  Nicotine dependence, cigarettes, uncomplicated     K22.710  Rock's esophagus with low grade dysplasia     R25.1  Tremor, unspecified     Z12.5  Encounter for screening for malignant neoplasm of prostate     Z12.11  Encounter for  screening for malignant neoplasm of colon     Z13.6  Encounter for screening for cardiovascular disorders     J30.9  Allergic rhinitis, unspecified

## 2021-07-01 VITALS
SYSTOLIC BLOOD PRESSURE: 129 MMHG | TEMPERATURE: 98.6 F | HEART RATE: 80 BPM | WEIGHT: 174.4 LBS | DIASTOLIC BLOOD PRESSURE: 71 MMHG | BODY MASS INDEX: 25.83 KG/M2 | HEIGHT: 69 IN | OXYGEN SATURATION: 96 %

## 2021-07-01 VITALS
TEMPERATURE: 97.1 F | HEART RATE: 99 BPM | SYSTOLIC BLOOD PRESSURE: 134 MMHG | BODY MASS INDEX: 25.33 KG/M2 | HEIGHT: 69 IN | WEIGHT: 171 LBS | DIASTOLIC BLOOD PRESSURE: 78 MMHG

## 2021-07-01 VITALS
BODY MASS INDEX: 25.3 KG/M2 | WEIGHT: 170.8 LBS | TEMPERATURE: 98.4 F | DIASTOLIC BLOOD PRESSURE: 72 MMHG | SYSTOLIC BLOOD PRESSURE: 130 MMHG | HEART RATE: 82 BPM | HEIGHT: 69 IN

## 2021-07-01 VITALS
HEART RATE: 87 BPM | WEIGHT: 169.7 LBS | HEIGHT: 69 IN | DIASTOLIC BLOOD PRESSURE: 90 MMHG | SYSTOLIC BLOOD PRESSURE: 138 MMHG | BODY MASS INDEX: 25.13 KG/M2 | TEMPERATURE: 96.7 F

## 2021-07-01 VITALS
TEMPERATURE: 97.9 F | DIASTOLIC BLOOD PRESSURE: 69 MMHG | HEIGHT: 69 IN | HEART RATE: 80 BPM | BODY MASS INDEX: 24.73 KG/M2 | SYSTOLIC BLOOD PRESSURE: 132 MMHG | WEIGHT: 167 LBS

## 2021-07-01 VITALS
HEART RATE: 98 BPM | DIASTOLIC BLOOD PRESSURE: 91 MMHG | TEMPERATURE: 97.6 F | HEIGHT: 69 IN | WEIGHT: 170.6 LBS | SYSTOLIC BLOOD PRESSURE: 156 MMHG | BODY MASS INDEX: 25.27 KG/M2

## 2021-07-02 VITALS
SYSTOLIC BLOOD PRESSURE: 141 MMHG | OXYGEN SATURATION: 93 % | DIASTOLIC BLOOD PRESSURE: 77 MMHG | BODY MASS INDEX: 22.01 KG/M2 | HEIGHT: 69 IN | TEMPERATURE: 97.3 F | WEIGHT: 148.6 LBS | HEART RATE: 106 BPM

## 2021-07-02 VITALS — HEIGHT: 69 IN | TEMPERATURE: 98.1 F | BODY MASS INDEX: 22.27 KG/M2

## 2021-07-02 VITALS
HEIGHT: 69 IN | DIASTOLIC BLOOD PRESSURE: 82 MMHG | TEMPERATURE: 97.2 F | BODY MASS INDEX: 23.46 KG/M2 | SYSTOLIC BLOOD PRESSURE: 145 MMHG | WEIGHT: 158.4 LBS | HEART RATE: 89 BPM

## 2021-07-12 ENCOUNTER — OFFICE VISIT (OUTPATIENT)
Dept: NEUROLOGY | Facility: CLINIC | Age: 71
End: 2021-07-12

## 2021-07-12 VITALS
BODY MASS INDEX: 24.17 KG/M2 | TEMPERATURE: 97.8 F | DIASTOLIC BLOOD PRESSURE: 79 MMHG | HEIGHT: 67 IN | WEIGHT: 154 LBS | HEART RATE: 84 BPM | SYSTOLIC BLOOD PRESSURE: 137 MMHG

## 2021-07-12 DIAGNOSIS — G25.0 ESSENTIAL TREMOR: Primary | ICD-10-CM

## 2021-07-12 PROCEDURE — 99214 OFFICE O/P EST MOD 30 MIN: CPT | Performed by: PSYCHIATRY & NEUROLOGY

## 2021-07-12 RX ORDER — PRIMIDONE 50 MG/1
250 TABLET ORAL 2 TIMES DAILY
COMMUNITY
Start: 2021-06-11 | End: 2021-07-12 | Stop reason: SDUPTHER

## 2021-07-12 RX ORDER — PRIMIDONE 250 MG/1
TABLET ORAL
Qty: 90 TABLET | Refills: 3 | Status: SHIPPED | OUTPATIENT
Start: 2021-07-12 | End: 2021-11-15

## 2021-07-12 RX ORDER — CHLORAL HYDRATE 500 MG
CAPSULE ORAL
COMMUNITY
End: 2022-10-14

## 2021-07-12 RX ORDER — MULTIPLE VITAMINS W/ MINERALS TAB 9MG-400MCG
1 TAB ORAL DAILY
COMMUNITY
End: 2022-11-01

## 2021-07-12 NOTE — ASSESSMENT & PLAN NOTE
I discussed with his sister that the primidone can be dosed at 8 AM, 12 noon and 4 PM.  He is to take 250 mg at 8 AM, 250 mg at noon and 125 mg at 4 PM for 2 weeks and if there is no improvement of his symptoms increase the medication to 250 mg 3 times a day.  I explained to her the adverse effects of the medication.  She manages his medications.  I discussed with them the goal is to improve his tremor but not give him toxicity.  I discussed with her that the medication may not control his tremor however according to him it does not bother him significantly and I discussed with her that treating the tremor with medications may pose more risk than benefits.  I will obtain laboratory work-up to check his CBC, liver function testing and primidone level.  I will see him again in 2 months time for follow-up.

## 2021-07-12 NOTE — PROGRESS NOTES
"Chief Complaint  Tremors    Subjective          Tristan Bolivar is a 71 y.o. male who presents to [facility]  History of Present Illness  Follow-up visit for essential tremor.  He is taking primidone 250 mg twice a day.  He states that the tremor does not bother him significantly other than when he is smoking or eating.  His sister is with him today.  She thinks it is worse than what he makes it to be.  He states that it does not bother him significantly.    Objective   Vital Signs:   /79 (BP Location: Right arm, Patient Position: Sitting, Cuff Size: Adult)   Pulse 84   Temp 97.8 °F (36.6 °C)   Ht 170.2 cm (67.01\")   Wt 69.9 kg (154 lb)   BMI 24.11 kg/m²     Physical Exam   There is no significant tremor noted on Archimedes spiral.  There is no significant tremor noted with hands extended or on finger-to-nose testing.  There is no rigidity or cogwheeling.  Station and gait is unremarkable.  Heart is regular rhythm normal in rate        Assessment and Plan  Diagnoses and all orders for this visit:    1. Essential tremor (Primary)  Assessment & Plan:  I discussed with his sister that the primidone can be dosed at 8 AM, 12 noon and 4 PM.  He is to take 250 mg at 8 AM, 250 mg at noon and 125 mg at 4 PM for 2 weeks and if there is no improvement of his symptoms increase the medication to 250 mg 3 times a day.  I explained to her the adverse effects of the medication.  She manages his medications.  I discussed with them the goal is to improve his tremor but not give him toxicity.  I discussed with her that the medication may not control his tremor however according to him it does not bother him significantly and I discussed with her that treating the tremor with medications may pose more risk than benefits.  I will obtain laboratory work-up to check his CBC, liver function testing and primidone level.  I will see him again in 2 months time for follow-up.    Orders:  -  "    Primidone Level; Future  -     CBC & Differential; Future  -     Comprehensive Metabolic Panel; Future       Total time spent with the patient and coordinating patient care was 25 minutes.    Follow Up  No follow-ups on file.  Patient was given instructions and counseling regarding his condition or for health maintenance advice. Please see specific information pulled into the AVS if appropriate.

## 2021-09-09 ENCOUNTER — TELEPHONE (OUTPATIENT)
Dept: NEUROLOGY | Facility: CLINIC | Age: 71
End: 2021-09-09

## 2021-09-09 NOTE — TELEPHONE ENCOUNTER
SPOKE W/ PATIENT'S SISTER, SHE ASKED US TO FAX ORDER TO [facility] IN [place] FOR UPCOMING APPT. FAXED [phone]

## 2021-09-13 ENCOUNTER — OFFICE VISIT (OUTPATIENT)
Dept: NEUROLOGY | Facility: CLINIC | Age: 71
End: 2021-09-13

## 2021-09-13 VITALS
BODY MASS INDEX: 24.33 KG/M2 | DIASTOLIC BLOOD PRESSURE: 81 MMHG | WEIGHT: 155 LBS | SYSTOLIC BLOOD PRESSURE: 142 MMHG | HEART RATE: 92 BPM | HEIGHT: 67 IN

## 2021-09-13 DIAGNOSIS — G25.0 ESSENTIAL TREMOR: Primary | ICD-10-CM

## 2021-09-13 PROCEDURE — 99213 OFFICE O/P EST LOW 20 MIN: CPT | Performed by: PSYCHIATRY & NEUROLOGY

## 2021-09-13 NOTE — ASSESSMENT & PLAN NOTE
He is to increase the primidone 250 mg 1 tablet 1 hour before he eats breakfast and lunch, and 1/2 tablet 1 hour before he eats dinner.  If there is no improvement of his symptoms he can take the medication 1 tablet 3 times a day an hour before he eats his meals.  I discussed with them that I am treating the tremor mainly because it interferes with his eating.  I discussed with the sister that I already mentioned this to him the last time he was here but he did not follow instructions.  She is going to keep up with him.  I will see him again in 2 months time for follow-up.

## 2021-09-13 NOTE — PROGRESS NOTES
"Chief Complaint  Tremors    Subjective          Tristan Bolivar is a 71 y.o. male who presents to [facility]GERY  History of Present Illness  71-year-old man here for follow-up of his facial tremor.  His history is with him.  He did not follow instructions.  He is taking primidone 250 mg in the morning and bedtime.  He states that he takes 1 in the morning and 2 at bedtime but his sister states that he is taking 1 in the morning and 1 at bedtime.  The shaking bothers him mainly when he is eating.  It also is him when he is smoking.  He had laboratory work-up in which the primidone level as well as the phenobarbital level is within therapeutic levels.  His CBC and comprehensive metabolic profile is otherwise unremarkable  Objective   Vital Signs:   /81   Pulse 92   Ht 170.2 cm (67.01\")   Wt 70.3 kg (155 lb)   BMI 24.27 kg/m²     Physical Exam   Alert, fluent, phasic, follows commands well.  There is a tremor noted hands extended finger-to-nose testing as she reaches the target.  On station gait is able to tiptoe, heel walk, doug.  There is no tremor noted.  Heart is regular rhythm normal in rate        Assessment and Plan  Diagnoses and all orders for this visit:    1. Essential tremor (Primary)  Assessment & Plan:  He is to increase the primidone 250 mg 1 tablet 1 hour before he eats breakfast and lunch, and 1/2 tablet 1 hour before he eats dinner.  If there is no improvement of his symptoms he can take the medication 1 tablet 3 times a day an hour before he eats his meals.  I discussed with them that I am treating the tremor mainly because it interferes with his eating.  I discussed with the sister that I already mentioned this to him the last time he was here but he did not follow instructions.  She is going to keep up with him.  I will see him again in 2 months time for follow-up.       Total time spent with the patient and coordinating patient care was 15 " minutes.    Follow Up  No follow-ups on file.  Patient was given instructions and counseling regarding his condition or for health maintenance advice. Please see specific information pulled into the AVS if appropriate.

## 2021-11-15 ENCOUNTER — OFFICE VISIT (OUTPATIENT)
Dept: NEUROLOGY | Facility: CLINIC | Age: 71
End: 2021-11-15

## 2021-11-15 VITALS
HEIGHT: 67 IN | WEIGHT: 157 LBS | DIASTOLIC BLOOD PRESSURE: 75 MMHG | SYSTOLIC BLOOD PRESSURE: 145 MMHG | BODY MASS INDEX: 24.64 KG/M2 | HEART RATE: 80 BPM

## 2021-11-15 DIAGNOSIS — G25.0 ESSENTIAL TREMOR: Primary | ICD-10-CM

## 2021-11-15 PROCEDURE — 99213 OFFICE O/P EST LOW 20 MIN: CPT | Performed by: PSYCHIATRY & NEUROLOGY

## 2021-11-15 NOTE — ASSESSMENT & PLAN NOTE
Discussed with him and his sister that I will take him off primidone gradually.  He should take a half a tablet 3 times a day this week, twice a day next week, once a day the third week and then stop taking primidone the fourth week.  They are to call me in a month to see if his tremors got worse.  They will consider making a follow-up visit in the future.  I discussed with them that they should get weighted utensils for him to use.  I showed him on the Internet were to get these weighted utensils that cost $20.

## 2021-11-15 NOTE — PROGRESS NOTES
"Chief Complaint  Tremors    Subjective          Tristan Bolivar is a 71 y.o. male who presents to [facility]UROLOG[facility] NEUROSURGERY  History of Present Illness  71-year-old man here for follow-up of his tremors.  He has essential tremor.  It bothers him while he is eating.  He is taking primidone 250 mg 3 times a day.  His sister is with him today.  He states that the primidone does not help him.  He has a history of breathing problems and is a heavy smoker.  He has a history of kidney stones.    Objective   Vital Signs:   /75   Pulse 80   Ht 170.2 cm (67\")   Wt 71.2 kg (157 lb)   BMI 24.59 kg/m²     Physical Exam   He is alert, fluent, phasic, follows commands well.  His Archimedes spiral is not significantly abnormal.  Mild tremor noted in hands extended and finger-to-nose testing.  No significant tremor in the left hand transferring water from his left hand to the right hand on a car.  The right hand is better.  There is no significant tremor noted using a plastic fork.        Assessment and Plan  Diagnoses and all orders for this visit:    1. Essential tremor (Primary)  Assessment & Plan:  Discussed with him and his sister that I will take him off primidone gradually.  He should take a half a tablet 3 times a day this week, twice a day next week, once a day the third week and then stop taking primidone the fourth week.  They are to call me in a month to see if his tremors got worse.  They will consider making a follow-up visit in the future.  I discussed with them that they should get weighted utensils for him to use.  I showed him on the Internet were to get these weighted utensils that cost $20.         Total time spent with the patient and coordinating patient care was 25 minutes.    Follow Up  No follow-ups on file.  Patient was given instructions and counseling regarding his condition or for health maintenance advice. Please see specific information pulled into the AVS if " appropriate.

## 2022-01-04 ENCOUNTER — TELEPHONE (OUTPATIENT)
Dept: NEUROLOGY | Facility: CLINIC | Age: 72
End: 2022-01-04

## 2022-01-04 NOTE — TELEPHONE ENCOUNTER
Caller: SHO WOLFE    Relationship: PT'S SISTER      What medications are you currently taking:   Current Outpatient Medications on File Prior to Visit   Medication Sig Dispense Refill   • Cholecalciferol 10 MCG (400 UNIT) capsule Vitamin D3 400 unit oral capsule take 1 capsule by oral route daily   Active     • multivitamin with minerals (CENTRUM SILVER 50+MEN PO) Take 1 tablet by mouth Daily.     • Omega-3 Fatty Acids (fish oil) 1000 MG capsule capsule Fish Oil 120-180 mg oral capsule take 1 capsule by oral route daily   Active       No current facility-administered medications on file prior to visit.          Which medication are you concerned about:  primidone    Who prescribed you this medication: DR. ELISE    What are your concerns: ON 11-15-21 DR. ELISE WANT THE PT TO TAPER DOWN THE PRIMIDONE GRADUALLY BUT THE PT DIDN'T WANT TO STOP THE MEDICATION. HE WAS AFRAID THAT HE WOULD GET WORSE IF HE CAME OFF THE  primidone. NOW HE NEEDS A NEW SCRIPT FOR THE  primidone SENT TO THE [facility] IN [facility]. PT IS ALMOST OUT OF THE MEDICATION, SHE WOULD LIKE THE PRESCRIPTION TODAY OR TOMORROW SENT TO [facility].      IF YOU HAVE ANY QUESTIONS PLEASE CALL HER BACK -[phone]

## 2022-01-05 RX ORDER — PRIMIDONE 250 MG/1
TABLET ORAL
Qty: 90 TABLET | Refills: 1 | Status: SHIPPED | OUTPATIENT
Start: 2022-01-05 | End: 2022-03-04 | Stop reason: SDUPTHER

## 2022-01-05 NOTE — TELEPHONE ENCOUNTER
Patients sister called back. I let her know refill had been sent but per  he is to get further refills from his primary care.

## 2022-01-05 NOTE — TELEPHONE ENCOUNTER
"There is no number listed for the pt directly. Attempted to call the pts sister and notify her and there was no answer. I did not leave a message because the answering matching was a gentleman's voice that said \"you have reached the Marquis's\". I will try to call again later. If the pt or sister calls back, hub can advised them that 2 months refills had been called in for Primidone and that the pt could get it from the pcp after that per Melissa and they can call with any further questions or concerns.   "

## 2022-01-05 NOTE — TELEPHONE ENCOUNTER
I called and notified the pts sister and she demonstrated understanding by restatement and I asked that they call with further questions or concerns and she said they would.

## 2022-01-05 NOTE — TELEPHONE ENCOUNTER
"I received the following message from the patients sister:     \"ON 11-15-21 DR. ELISE WANT THE PT TO TAPER DOWN THE PRIMIDONE GRADUALLY BUT THE PT DIDN'T WANT TO STOP THE MEDICATION. HE WAS AFRAID THAT HE WOULD GET WORSE IF HE CAME OFF THE  primidone. NOW HE NEEDS A NEW SCRIPT FOR THE  primidone SENT TO THE [facility] IN [facility]. PT IS ALMOST OUT OF THE MEDICATION, SHE WOULD LIKE THE PRESCRIPTION TODAY OR TOMORROW SENT TO [facility].\"    I called and advised the pts sister that the pt should have called and let us know before running out and deciding not to follow Dr. Elise recommendations and she apologized and said it was her fault as she takes care of his medicines and she would stay more on top of things in the future. I also advised her that he would likely need a f/u before we could make changes and she said she understood but he is about to run out of Primidone.   "

## 2022-03-04 ENCOUNTER — OFFICE VISIT (OUTPATIENT)
Dept: FAMILY MEDICINE CLINIC | Age: 72
End: 2022-03-04

## 2022-03-04 VITALS
HEIGHT: 67 IN | WEIGHT: 149.4 LBS | BODY MASS INDEX: 23.45 KG/M2 | TEMPERATURE: 97.7 F | HEART RATE: 93 BPM | DIASTOLIC BLOOD PRESSURE: 79 MMHG | SYSTOLIC BLOOD PRESSURE: 141 MMHG | OXYGEN SATURATION: 93 %

## 2022-03-04 DIAGNOSIS — G25.0 ESSENTIAL TREMOR: Primary | ICD-10-CM

## 2022-03-04 DIAGNOSIS — F17.218 CIGARETTE NICOTINE DEPENDENCE WITH OTHER NICOTINE-INDUCED DISORDER: ICD-10-CM

## 2022-03-04 DIAGNOSIS — Z12.5 SCREENING FOR MALIGNANT NEOPLASM OF PROSTATE: ICD-10-CM

## 2022-03-04 DIAGNOSIS — Z13.6 SCREENING FOR CARDIOVASCULAR CONDITION: ICD-10-CM

## 2022-03-04 PROCEDURE — 99214 OFFICE O/P EST MOD 30 MIN: CPT | Performed by: NURSE PRACTITIONER

## 2022-03-04 RX ORDER — BUPROPION HYDROCHLORIDE 150 MG/1
150 TABLET, EXTENDED RELEASE ORAL 2 TIMES DAILY
Qty: 60 TABLET | Refills: 0 | Status: SHIPPED | OUTPATIENT
Start: 2022-03-04 | End: 2022-03-30

## 2022-03-04 RX ORDER — PRIMIDONE 250 MG/1
TABLET ORAL
Qty: 30 TABLET | Refills: 0 | Status: SHIPPED | OUTPATIENT
Start: 2022-03-04 | End: 2022-03-30

## 2022-03-04 NOTE — PROGRESS NOTES
Chief Complaint  Tristan Bolivar presents to [facility] for Nuero (Pt has been pescribed primidone for tremors and wants his meds managed by PCP)    Subjective          Tristan is here for follow up on Tremors. He generally sees Dr. Yates but is generlaly stable on his current treatment of primidone and is ok to follow up here given the location of specialty office.  He is here with his sister , ирина Blevins and reports that she is unsure if he takes his medication as prescribed but she does put his medications together for him weekly and calls him daily.      Ирина is also requesting refills on Wellbutrin for smoking cessation.  She reports that Tristan smokes 1+ packs per day , he does have nicotine stains on his fingers and is having shortness of breath at times and coughs quite a bit.  He has not been evalutated for COPD in the past.  Has never been on inhaler, but reports taht she is concerned that he may not be able to be compliant        Review of Systems      No Known Allergies   Past Medical History:   Diagnosis Date   • Tremor      Current Outpatient Medications   Medication Sig Dispense Refill   • Cholecalciferol 10 MCG (400 UNIT) capsule Vitamin D3 400 unit oral capsule take 1 capsule by oral route daily   Active     • multivitamin with minerals (CENTRUM SILVER 50+MEN PO) Take 1 tablet by mouth Daily.     • Omega-3 Fatty Acids (fish oil) 1000 MG capsule capsule Fish Oil 120-180 mg oral capsule take 1 capsule by oral route daily   Active     • primidone (MYSOLINE) 250 MG tablet Take up to 1 tablet 3 times a day 30 tablet 0   • buPROPion SR (Wellbutrin SR) 150 MG 12 hr tablet Take 1 tablet by mouth 2 (Two) Times a Day. 60 tablet 0     No current facility-administered medications for this visit.     Past Surgical History:   Procedure Laterality Date   • HIP SURGERY        Social History     Tobacco Use   • Smoking status: Current Every Day Smoker   • Smokeless tobacco:  "Never Used   Vaping Use   • Vaping Use: Never used   Substance Use Topics   • Alcohol use: Not Currently   • Drug use: Not on file     History reviewed. No pertinent family history.  Health Maintenance Due   Topic Date Due   • TDAP/TD VACCINES (1 - Tdap) Never done   • ZOSTER VACCINE (1 of 2) Never done   • Pneumococcal Vaccine 65+ (1 of 2 - PPSV23) 03/12/2018   • ANNUAL WELLNESS VISIT  Never done      Immunization History   Administered Date(s) Administered   • COVID-19 (PFIZER) PURPLE CAP 03/27/2021, 03/27/2021, 04/17/2021, 04/17/2021, 01/05/2022   • Fluzone High Dose =>65 Years ([employer] ONLY) 09/14/2017   • Pneumococcal Conjugate 13-Valent (PCV13) 01/15/2018        Objective     Vitals:    03/04/22 1345   BP: 141/79   BP Location: Right arm   Patient Position: Sitting   Cuff Size: Adult   Pulse: 93   Temp: 97.7 °F (36.5 °C)   TempSrc: Oral   SpO2: 93%   Weight: 67.8 kg (149 lb 6.4 oz)   Height: 170.2 cm (67\")     Body mass index is 23.4 kg/m².     Physical Exam  Vitals reviewed.   Constitutional:       Appearance: Normal appearance.   HENT:      Head: Normocephalic.   Eyes:      Pupils: Pupils are equal, round, and reactive to light.   Cardiovascular:      Rate and Rhythm: Normal rate and regular rhythm.      Heart sounds: No murmur heard.      Pulmonary:      Effort: Pulmonary effort is normal.      Breath sounds: Examination of the right-lower field reveals wheezing. Examination of the left-lower field reveals wheezing. Wheezing present.   Musculoskeletal:         General: Normal range of motion.   Neurological:      Mental Status: He is alert.   Psychiatric:         Mood and Affect: Mood normal.         Behavior: Behavior normal.           Result Review :                               Assessment and Plan      Diagnoses and all orders for this visit:    1. Essential tremor (Primary)  Comments:  will refill meds as requested and recommend follow up in 6 months   Orders:  -     primidone (MYSOLINE) 250 MG tablet; " Take up to 1 tablet 3 times a day  Dispense: 30 tablet; Refill: 0    2. Cigarette nicotine dependence with other nicotine-induced disorder  Comments:  Wellbutrin as prescribed, work on cessation and reduce availability of cigarettes over time  Orders:  -     buPROPion SR (Wellbutrin SR) 150 MG 12 hr tablet; Take 1 tablet by mouth 2 (Two) Times a Day.  Dispense: 60 tablet; Refill: 0    3. Screening for cardiovascular condition  -     Comprehensive metabolic panel; Future  -     Lipid panel; Future    4. Screening for malignant neoplasm of prostate  -     PSA SCREENING; Future              Follow Up     Return in about 3 months (around 6/4/2022) for Medicare Wellness.

## 2022-03-29 DIAGNOSIS — G25.0 ESSENTIAL TREMOR: ICD-10-CM

## 2022-03-29 DIAGNOSIS — F17.218 CIGARETTE NICOTINE DEPENDENCE WITH OTHER NICOTINE-INDUCED DISORDER: ICD-10-CM

## 2022-03-29 NOTE — TELEPHONE ENCOUNTER
Caller: SHO BERNARDO    Relationship: Emergency Contact    Best call back number: [phone]    Requested Prescriptions:   Requested Prescriptions     Pending Prescriptions Disp Refills   • primidone (MYSOLINE) 250 MG tablet 30 tablet 0     Sig: Take up to 1 tablet 3 times a day   • buPROPion SR (Wellbutrin SR) 150 MG 12 hr tablet 60 tablet 0     Sig: Take 1 tablet by mouth 2 (Two) Times a Day.        Pharmacy where request should be sent: [facility] - [address] - [phone]  - [phone]     Does the patient have less than a 3 day supply:  [] Yes  [x] No    Ivan DON Rep   03/29/22 13:17 EDT

## 2022-03-30 RX ORDER — PRIMIDONE 250 MG/1
TABLET ORAL
Qty: 30 TABLET | Refills: 0 | OUTPATIENT
Start: 2022-03-30

## 2022-03-30 RX ORDER — BUPROPION HYDROCHLORIDE 150 MG/1
150 TABLET, EXTENDED RELEASE ORAL 2 TIMES DAILY
Qty: 60 TABLET | Refills: 0 | OUTPATIENT
Start: 2022-03-30

## 2022-03-30 RX ORDER — PRIMIDONE 250 MG/1
TABLET ORAL
Qty: 30 TABLET | Refills: 0 | Status: SHIPPED | OUTPATIENT
Start: 2022-03-30 | End: 2022-04-11

## 2022-03-30 RX ORDER — BUPROPION HYDROCHLORIDE 150 MG/1
TABLET, EXTENDED RELEASE ORAL
Qty: 60 TABLET | Refills: 0 | Status: SHIPPED | OUTPATIENT
Start: 2022-03-30 | End: 2022-04-08 | Stop reason: SDUPTHER

## 2022-04-08 ENCOUNTER — TELEPHONE (OUTPATIENT)
Dept: FAMILY MEDICINE CLINIC | Age: 72
End: 2022-04-08

## 2022-04-08 DIAGNOSIS — F17.218 CIGARETTE NICOTINE DEPENDENCE WITH OTHER NICOTINE-INDUCED DISORDER: ICD-10-CM

## 2022-04-08 DIAGNOSIS — G25.0 ESSENTIAL TREMOR: ICD-10-CM

## 2022-04-09 DIAGNOSIS — G25.0 ESSENTIAL TREMOR: ICD-10-CM

## 2022-04-11 RX ORDER — PRIMIDONE 250 MG/1
TABLET ORAL
Qty: 90 TABLET | Refills: 1 | Status: SHIPPED | OUTPATIENT
Start: 2022-04-11 | End: 2022-04-12 | Stop reason: SDUPTHER

## 2022-04-12 DIAGNOSIS — G25.0 ESSENTIAL TREMOR: ICD-10-CM

## 2022-04-12 RX ORDER — PRIMIDONE 250 MG/1
TABLET ORAL
Qty: 90 TABLET | Refills: 1 | Status: SHIPPED | OUTPATIENT
Start: 2022-04-12 | End: 2022-05-05 | Stop reason: SDUPTHER

## 2022-04-26 ENCOUNTER — TELEPHONE (OUTPATIENT)
Dept: FAMILY MEDICINE CLINIC | Age: 72
End: 2022-04-26

## 2022-05-03 ENCOUNTER — TELEPHONE (OUTPATIENT)
Dept: FAMILY MEDICINE CLINIC | Age: 72
End: 2022-05-03

## 2022-05-04 ENCOUNTER — TELEPHONE (OUTPATIENT)
Dept: FAMILY MEDICINE CLINIC | Age: 72
End: 2022-05-04

## 2022-05-04 NOTE — TELEPHONE ENCOUNTER
Caller: SHO BERNARDO    Relationship: Emergency Contact    Best call back number: [phone]    Requested Prescriptions: buPROPion SR (WELLBUTRIN SR) 150 MG 12 hr tablet  Requested Prescriptions      No prescriptions requested or ordered in this encounter        Pharmacy where request should be sent:      [facility] [address] [place] [address] [address] [facility] - [phone]  - [phone]   [phone]    Additional details provided by patient: PATIENT IS ALMOST OUT    Does the patient have less than a 3 day supply:  [x] Yes  [] No    Ivan Viveros Rep   05/04/22 16:46 EDT      SHO CALLED TO STATE THAT RYNE HAD NOT RECEIVED HIS primidone (MYSOLINE) 250 MG tablet OR HIS buPROPion SR (WELLBUTRIN SR) 150 MG 12 hr tablet FROM [facility]. BOTH WERE SENT BUT WENT TO [facility]. JACOBO AT [facility] WILL GET THE PRIMIDONE READY FOR RYNE BUT THE BUPROPION WILL NEED TO HAVE ANOTHER PRESCRIPTION SENT OVER.    ALSO SHO IS WONDERING IF BOTH COULD STILL BE SENT TO [facility] [facility] SO THAT SHE WILL HAVE THAT SUPPLY AS WELL

## 2022-05-05 RX ORDER — PRIMIDONE 250 MG/1
TABLET ORAL
Qty: 90 TABLET | Refills: 0 | Status: SHIPPED | OUTPATIENT
Start: 2022-05-05 | End: 2022-06-03

## 2022-05-05 RX ORDER — BUPROPION HYDROCHLORIDE 150 MG/1
150 TABLET, EXTENDED RELEASE ORAL 2 TIMES DAILY
Qty: 180 TABLET | Refills: 0 | Status: SHIPPED | OUTPATIENT
Start: 2022-05-05 | End: 2022-05-05 | Stop reason: SDUPTHER

## 2022-05-05 RX ORDER — BUPROPION HYDROCHLORIDE 150 MG/1
150 TABLET, EXTENDED RELEASE ORAL 2 TIMES DAILY
Qty: 60 TABLET | Refills: 0 | Status: SHIPPED | OUTPATIENT
Start: 2022-05-05 | End: 2022-08-12

## 2022-06-02 DIAGNOSIS — G25.0 ESSENTIAL TREMOR: ICD-10-CM

## 2022-06-03 RX ORDER — PRIMIDONE 250 MG/1
TABLET ORAL
Qty: 90 TABLET | Refills: 0 | Status: SHIPPED | OUTPATIENT
Start: 2022-06-03 | End: 2022-07-22

## 2022-07-14 ENCOUNTER — OFFICE VISIT (OUTPATIENT)
Dept: FAMILY MEDICINE CLINIC | Age: 72
End: 2022-07-14

## 2022-07-14 VITALS
HEART RATE: 88 BPM | BODY MASS INDEX: 21.91 KG/M2 | WEIGHT: 139.6 LBS | HEIGHT: 67 IN | DIASTOLIC BLOOD PRESSURE: 61 MMHG | OXYGEN SATURATION: 96 % | SYSTOLIC BLOOD PRESSURE: 113 MMHG | TEMPERATURE: 98.5 F

## 2022-07-14 DIAGNOSIS — R05.9 COUGH: ICD-10-CM

## 2022-07-14 DIAGNOSIS — J02.9 SORE THROAT: Primary | ICD-10-CM

## 2022-07-14 LAB
EXPIRATION DATE: NORMAL
EXPIRATION DATE: NORMAL
FLUAV AG UPPER RESP QL IA.RAPID: NOT DETECTED
FLUBV AG UPPER RESP QL IA.RAPID: NOT DETECTED
INTERNAL CONTROL: NORMAL
INTERNAL CONTROL: NORMAL
Lab: NORMAL
Lab: NORMAL
S PYO AG THROAT QL: NEGATIVE
SARS-COV-2 AG UPPER RESP QL IA.RAPID: NOT DETECTED

## 2022-07-14 PROCEDURE — 87081 CULTURE SCREEN ONLY: CPT | Performed by: NURSE PRACTITIONER

## 2022-07-14 PROCEDURE — 87428 SARSCOV & INF VIR A&B AG IA: CPT | Performed by: NURSE PRACTITIONER

## 2022-07-14 PROCEDURE — 87880 STREP A ASSAY W/OPTIC: CPT | Performed by: NURSE PRACTITIONER

## 2022-07-14 PROCEDURE — 99213 OFFICE O/P EST LOW 20 MIN: CPT | Performed by: NURSE PRACTITIONER

## 2022-07-14 RX ORDER — ALBUTEROL SULFATE 90 UG/1
2 AEROSOL, METERED RESPIRATORY (INHALATION) EVERY 4 HOURS PRN
Qty: 8 G | Refills: 0 | Status: SHIPPED | OUTPATIENT
Start: 2022-07-14 | End: 2023-02-15 | Stop reason: SDUPTHER

## 2022-07-14 NOTE — PROGRESS NOTES
Tristan Bolivar presents to [facility].    Chief Complaint:  Sore Throat (X 1 week ) and Cough  Here with his sister today         History of Present Illness:  URI  When did symptoms start: one week   Symptoms:sore throat, dry cough, clear runny nose  Treatment tried:none  smoker    PAST MEDICAL HISTORY         Peptic Ulcer Disease     Renal Stones: dx'd in 12/2018; he has undergone ureteral stent placement; ;         Surgical History:         Fracture Repair: left hip;         Family History:       Father: Medical history unknown     Mother:    Positive for Alzheimer's Disease;     Brother(s):    Positive for Myocardial Infarction;     Sister(s):    Positive for Type 2 Diabetes;         Social History:       Occupation:    Disabled     Marital Status: Single     Children: None       Review of Systems:  Review of Systems   Constitutional: Negative for fever.   Respiratory: Positive for wheezing (at times ).    Cardiovascular: Negative for chest pain.          Current Outpatient Medications:   •  buPROPion SR (WELLBUTRIN SR) 150 MG 12 hr tablet, Take 1 tablet by mouth 2 (Two) Times a Day., Disp: 60 tablet, Rfl: 0  •  Cholecalciferol 10 MCG (400 UNIT) capsule, Vitamin D3 400 unit oral capsule take 1 capsule by oral route daily   Active, Disp: , Rfl:   •  multivitamin with minerals tablet tablet, Take 1 tablet by mouth Daily., Disp: , Rfl:   •  Omega-3 Fatty Acids (fish oil) 1000 MG capsule capsule, Fish Oil 120-180 mg oral capsule take 1 capsule by oral route daily   Active, Disp: , Rfl:   •  primidone (MYSOLINE) 250 MG tablet, TAKE 1 TABLET BY MOUTH UP TO THREE TIMES DAILY, Disp: 90 tablet, Rfl: 0  •  albuterol sulfate  (90 Base) MCG/ACT inhaler, Inhale 2 puffs Every 4 (Four) Hours As Needed for Wheezing., Disp: 8 g, Rfl: 0    Vital Signs:   Vitals:    07/14/22 1349   BP: 113/61   BP Location: Left arm   Patient Position: Sitting   Pulse: 88   Temp: 98.5 °F (36.9 °C)   TempSrc:  "Oral   SpO2: 96%  Comment: room air   Weight: 63.3 kg (139 lb 9.6 oz)   Height: 170.2 cm (67.01\")         Physical Exam:  Physical Exam  Constitutional:       General: He is not in acute distress.     Appearance: Normal appearance.   HENT:      Right Ear: There is impacted cerumen.      Left Ear: There is impacted cerumen.      Nose:      Comments: No sinus tenderness      Mouth/Throat:      Pharynx: Oropharynx is clear. No posterior oropharyngeal erythema.   Cardiovascular:      Rate and Rhythm: Normal rate and regular rhythm.      Heart sounds: No murmur heard.  Pulmonary:      Effort: Pulmonary effort is normal.      Breath sounds: Normal breath sounds.   Lymphadenopathy:      Cervical: No cervical adenopathy.   Neurological:      Mental Status: He is alert.   Psychiatric:         Mood and Affect: Mood normal.         Behavior: Behavior normal.         Result Review      The following data was reviewed by: RADHA Parsons on 07/14/2022:    Results for orders placed or performed in visit on 07/14/22   POCT SARS-CoV-2 Antigen BOGDAN + Flu    Specimen: Swab   Result Value Ref Range    SARS Antigen Not Detected Not Detected, Presumptive Negative    Influenza A Antigen BOGDAN Not Detected Not Detected    Influenza B Antigen BOGDAN Not Detected Not Detected    Internal Control Passed Passed    Lot Number [id number]     Expiration Date 1/21/23    POCT rapid strep A    Specimen: Swab   Result Value Ref Range    Rapid Strep A Screen Negative Negative, VALID, INVALID, Not Performed    Internal Control Passed Passed    Lot Number [id number]     Expiration Date 12/9/23                Assessment and Plan:          Diagnoses and all orders for this visit:    1. Sore throat (Primary)  Assessment & Plan:  Warm salt water gargles  RSS: negative     Orders:  -     POCT SARS-CoV-2 Antigen BOGDAN + Flu  -     POCT rapid strep A  -     Beta Strep Culture, Throat - Swab, Throat    2. Cough  Assessment & Plan:  Rapid covid screen: " negative  Flu screen: negative   Rest, increase fluids, follow up if symptoms progress or change   Advised to quit smoking, will give him an inhaler to try  Also use mucinex and claritin       Orders:  -     Beta Strep Culture, Throat - Swab, Throat  -     albuterol sulfate  (90 Base) MCG/ACT inhaler; Inhale 2 puffs Every 4 (Four) Hours As Needed for Wheezing.  Dispense: 8 g; Refill: 0        Follow Up   Return if symptoms worsen or fail to improve.  Patient was given instructions and counseling regarding his condition or for health maintenance advice. Please see specific information pulled into the AVS if appropriate.

## 2022-07-14 NOTE — ASSESSMENT & PLAN NOTE
Rapid covid screen: negative  Flu screen: negative   Rest, increase fluids, follow up if symptoms progress or change   Advised to quit smoking, will give him an inhaler to try  Also use mucinex and claritin

## 2022-07-16 LAB — BACTERIA SPEC AEROBE CULT: NORMAL

## 2022-07-20 DIAGNOSIS — G25.0 ESSENTIAL TREMOR: ICD-10-CM

## 2022-07-22 RX ORDER — PRIMIDONE 250 MG/1
TABLET ORAL
Qty: 90 TABLET | Refills: 0 | Status: SHIPPED | OUTPATIENT
Start: 2022-07-22 | End: 2022-09-12

## 2022-08-02 ENCOUNTER — TELEPHONE (OUTPATIENT)
Dept: FAMILY MEDICINE CLINIC | Age: 72
End: 2022-08-02

## 2022-08-02 NOTE — TELEPHONE ENCOUNTER
----- Message from Jazmyn Nixon LPN sent at 7/25/2022  9:23 AM EDT -----      ----- Message -----  From: SYSTEM  Sent: 7/24/2022   1:41 AM EDT  To: [facility] [place] [facility] [place]

## 2022-08-09 NOTE — TELEPHONE ENCOUNTER
8/9/22 spoke with pt re overdue PSA, [facility] Printed off lab order and placed up front for . -2nd attempt

## 2022-08-10 DIAGNOSIS — F17.218 CIGARETTE NICOTINE DEPENDENCE WITH OTHER NICOTINE-INDUCED DISORDER: ICD-10-CM

## 2022-08-11 DIAGNOSIS — R97.20 ELEVATED PSA, GREATER THAN OR EQUAL TO 20 NG/ML: Primary | ICD-10-CM

## 2022-08-12 LAB — PSA SERPL-MCNC: 20.8 NG/ML (ref 0–4)

## 2022-08-12 RX ORDER — BUPROPION HYDROCHLORIDE 150 MG/1
TABLET, EXTENDED RELEASE ORAL
Qty: 180 TABLET | Refills: 0 | Status: SHIPPED | OUTPATIENT
Start: 2022-08-12 | End: 2022-11-01

## 2022-08-13 LAB
ALBUMIN SERPL-MCNC: 3.6 G/DL (ref 3.7–4.7)
ALBUMIN/GLOB SERPL: 1.7 {RATIO} (ref 1.2–2.2)
ALP SERPL-CCNC: 212 IU/L (ref 44–121)
ALT SERPL-CCNC: 23 IU/L (ref 0–44)
AST SERPL-CCNC: 24 IU/L (ref 0–40)
BILIRUB SERPL-MCNC: 0.2 MG/DL (ref 0–1.2)
BUN SERPL-MCNC: 7 MG/DL (ref 8–27)
BUN/CREAT SERPL: 8 (ref 10–24)
CALCIUM SERPL-MCNC: 8.8 MG/DL (ref 8.6–10.2)
CHLORIDE SERPL-SCNC: 101 MMOL/L (ref 96–106)
CHOLEST SERPL-MCNC: 157 MG/DL (ref 100–199)
CO2 SERPL-SCNC: 24 MMOL/L (ref 20–29)
CREAT SERPL-MCNC: 0.86 MG/DL (ref 0.76–1.27)
EGFRCR SERPLBLD CKD-EPI 2021: 93 ML/MIN/1.73
GLOBULIN SER CALC-MCNC: 2.1 G/DL (ref 1.5–4.5)
GLUCOSE SERPL-MCNC: 86 MG/DL (ref 65–99)
HDLC SERPL-MCNC: 36 MG/DL
LDLC SERPL CALC-MCNC: 99 MG/DL (ref 0–99)
POTASSIUM SERPL-SCNC: 3.9 MMOL/L (ref 3.5–5.2)
PROT SERPL-MCNC: 5.7 G/DL (ref 6–8.5)
SODIUM SERPL-SCNC: 140 MMOL/L (ref 134–144)
TRIGL SERPL-MCNC: 121 MG/DL (ref 0–149)
VLDLC SERPL CALC-MCNC: 22 MG/DL (ref 5–40)
WRITTEN AUTHORIZATION: NORMAL

## 2022-08-17 ENCOUNTER — TELEPHONE (OUTPATIENT)
Dept: FAMILY MEDICINE CLINIC | Age: 72
End: 2022-08-17

## 2022-08-17 ENCOUNTER — OFFICE VISIT (OUTPATIENT)
Dept: FAMILY MEDICINE CLINIC | Age: 72
End: 2022-08-17

## 2022-08-17 VITALS
SYSTOLIC BLOOD PRESSURE: 101 MMHG | BODY MASS INDEX: 21.16 KG/M2 | HEIGHT: 67 IN | WEIGHT: 134.8 LBS | TEMPERATURE: 98.2 F | DIASTOLIC BLOOD PRESSURE: 62 MMHG | HEART RATE: 94 BPM

## 2022-08-17 DIAGNOSIS — J02.9 SORE THROAT: Primary | ICD-10-CM

## 2022-08-17 DIAGNOSIS — F17.218 CIGARETTE NICOTINE DEPENDENCE WITH OTHER NICOTINE-INDUCED DISORDER: ICD-10-CM

## 2022-08-17 DIAGNOSIS — D12.6 ADENOMATOUS POLYP OF COLON, UNSPECIFIED PART OF COLON: ICD-10-CM

## 2022-08-17 DIAGNOSIS — K22.719 BARRETT'S ESOPHAGUS WITH DYSPLASIA: ICD-10-CM

## 2022-08-17 DIAGNOSIS — R13.10 DYSPHAGIA, UNSPECIFIED TYPE: ICD-10-CM

## 2022-08-17 DIAGNOSIS — Z23 ENCOUNTER FOR IMMUNIZATION: ICD-10-CM

## 2022-08-17 DIAGNOSIS — R05.9 COUGH: ICD-10-CM

## 2022-08-17 DIAGNOSIS — Z00.00 MEDICARE ANNUAL WELLNESS VISIT, SUBSEQUENT: Primary | ICD-10-CM

## 2022-08-17 DIAGNOSIS — R63.4 WEIGHT LOSS, NON-INTENTIONAL: ICD-10-CM

## 2022-08-17 DIAGNOSIS — R06.09 DYSPNEA ON EXERTION: ICD-10-CM

## 2022-08-17 DIAGNOSIS — F79 INTELLECTUAL DISABILITY: ICD-10-CM

## 2022-08-17 DIAGNOSIS — K22.89 ESOPHAGEAL MASS: ICD-10-CM

## 2022-08-17 PROBLEM — F17.200 NICOTINE DEPENDENCE: Status: ACTIVE | Noted: 2022-08-17

## 2022-08-17 PROCEDURE — G0009 ADMIN PNEUMOCOCCAL VACCINE: HCPCS | Performed by: NURSE PRACTITIONER

## 2022-08-17 PROCEDURE — 90677 PCV20 VACCINE IM: CPT | Performed by: NURSE PRACTITIONER

## 2022-08-17 PROCEDURE — 96160 PT-FOCUSED HLTH RISK ASSMT: CPT | Performed by: NURSE PRACTITIONER

## 2022-08-17 PROCEDURE — G0439 PPPS, SUBSEQ VISIT: HCPCS | Performed by: NURSE PRACTITIONER

## 2022-08-17 PROCEDURE — 1170F FXNL STATUS ASSESSED: CPT | Performed by: NURSE PRACTITIONER

## 2022-08-17 PROCEDURE — 1159F MED LIST DOCD IN RCRD: CPT | Performed by: NURSE PRACTITIONER

## 2022-08-17 RX ORDER — GUAIFENESIN 600 MG/1
1200 TABLET, EXTENDED RELEASE ORAL 2 TIMES DAILY
Qty: 14 TABLET | Refills: 0 | Status: SHIPPED | OUTPATIENT
Start: 2022-08-17 | End: 2023-01-23

## 2022-08-17 RX ORDER — TIOTROPIUM BROMIDE INHALATION SPRAY 3.12 UG/1
2 SPRAY, METERED RESPIRATORY (INHALATION)
Qty: 3 EACH | Refills: 1 | Status: SHIPPED | OUTPATIENT
Start: 2022-08-17 | End: 2022-10-14

## 2022-08-17 NOTE — TELEPHONE ENCOUNTER
Caller: SHO BERNARDO    Relationship: Emergency Contact    Best call back number: [phone]    What is the medical concern/diagnosis: SORE THROAT     What specialty or service is being requested:  ENT           Any additional details:       THE PATIENT'S SISTER SAID THE PATIENT WAS EATING FRENCH FRIES AND SAID HIS THROAT HURTS REALLY BAD WHEN HE SWALLOWS. SHE SAID THE PATIENT IS WANTING TO BE REFERRED TO A THROAT DOCTOR.     PLEASE CALL AND ADVISE

## 2022-08-17 NOTE — PROGRESS NOTES
The ABCs of the Annual Wellness Visit  Subsequent Medicare Wellness Visit    Chief Complaint   Patient presents with   • Medicare Wellness-subsequent     PSA and other labs.;  Present is his sister Bethany      Subjective    History of Present Illness:  Tristan Bolivar is a 72 y.o. male who presents for a Subsequent Medicare Wellness Visit.    The following portions of the patient's history were reviewed and   updated as appropriate: allergies, current medications, past family history, past medical history, past social history, past surgical history and problem list.    Compared to one year ago, the patient feels his physical   health is worse.    Compared to one year ago, the patient feels his mental   health is the same.    Emir is here today with his sister Bethany for his Medicare wellness.  He is also currently in the process of a follow-up on a recent elevation in his PSA.  He does admit to having to push to urinate.  He also reports that he has been having some difficulty having bowel movements as well.  Emir had had some previous elevations with his PSA in the past up to 5 and unfortunately he was lost to follow-up due to multiple reasons 1 including a lack of insurance for a period of time.  In the past he did have treatment with Cipro which did result in a slight improvement with his PSA back in 2018.  He is scheduled for further evaluation and recommendation with Dr. Montejo urology on August 31.    Upon review of his chart, Emir also is due for a follow-up colonoscopy and EGD.  Again this was lost to follow-up.  He did have a colonoscopy in 2018 which noted a polyp and had recommended 3-year follow-up which would have made that around March 2021.  We will get this scheduled after his PSA work-up is complete.  He was also recommended to follow-up for an EGD for a previous diagnosis of Rock's esophagus.  This was actually due in 2019 so we will get these procedures scheduled in the near future.    Emir Sims  sister reports that she has noticed a change in his eating habits recently.  She reports that he has previously eaten fried chicken or pizza on a daily basis and he has decided that he is only interested in peanut butter crackers and drinking.  He has noted a weight loss of 5 pounds since July and actually 23 pounds since 2021.  Emir does report loss of appetite.    Emir is a heavy smoker and he was placed on Wellbutrin a few months back to see if this would help improve his ability to decrease his use.  Bethany reports he is actually increased his cigarettes to go around 2 packs a day.  Emir states that he is not interested in quitting.  Hank reports that Emir has been having increase in coughing fits as well as some clear sputum produced with these coughing spells.  He has been using an albuterol inhaler 2 puffs several times a day per his report its generally 2 times a day.  He denies any improvement after his albuterol use.  He has never been diagnosed with COPD however we did discuss that this is a probability and will make some changes to his inhaler.  Recent Hospitalizations:  He was not admitted to the hospital during the last year.       Current Medical Providers:  Patient Care Team:  Ashley Robertson APRN as PCP - General (Nurse Practitioner)    Outpatient Medications Prior to Visit   Medication Sig Dispense Refill   • albuterol sulfate  (90 Base) MCG/ACT inhaler Inhale 2 puffs Every 4 (Four) Hours As Needed for Wheezing. 8 g 0   • buPROPion SR (WELLBUTRIN SR) 150 MG 12 hr tablet TAKE 1 TABLET TWICE DAILY 180 tablet 0   • Cholecalciferol 10 MCG (400 UNIT) capsule Vitamin D3 400 unit oral capsule take 1 capsule by oral route daily   Active     • multivitamin with minerals tablet tablet Take 1 tablet by mouth Daily.     • Omega-3 Fatty Acids (fish oil) 1000 MG capsule capsule Fish Oil 120-180 mg oral capsule take 1 capsule by oral route daily   Active     • primidone (MYSOLINE) 250 MG tablet TAKE 1  "TABLET BY MOUTH UP TO THREE TIMES DAILY 90 tablet 0     No facility-administered medications prior to visit.       No opioid medication identified on active medication list. I have reviewed chart for other potential  high risk medication/s and harmful drug interactions in the elderly.          Aspirin is not on active medication list.  Aspirin use is not indicated based on review of current medical condition/s. Risk of harm outweighs potential benefits.  .    Patient Active Problem List   Diagnosis   • Essential tremor   • Sore throat   • Cough   • Adenomatous polyp of colon   • Dyspnea on exertion   • Rock's esophagus with dysplasia   • Intellectual disability   • Nicotine dependence     Advance Care Planning  Advance Directive is not on file.  ACP discussion was held with the patient during this visit. Patient does not have an advance directive, declines further assistance.    Review of Systems   Constitutional: Positive for appetite change (per sister, not eating the things he generally eats  over the past 6-12months ).   HENT: Negative for trouble swallowing.    Respiratory: Positive for choking, shortness of breath and wheezing.    Cardiovascular: Negative for chest pain.   Gastrointestinal: Positive for constipation.   Genitourinary: Positive for difficulty urinating (reports has to 'push it out').   Musculoskeletal: Negative for arthralgias and back pain.   Neurological: Negative for dizziness.        Objective    Vitals:    08/17/22 1307   BP: 101/62   BP Location: Right arm   Patient Position: Sitting   Cuff Size: Adult   Pulse: 94   Temp: 98.2 °F (36.8 °C)   TempSrc: Oral   Weight: 61.1 kg (134 lb 12.8 oz)   Height: 170.2 cm (67.01\")     Estimated body mass index is 21.11 kg/m² as calculated from the following:    Height as of this encounter: 170.2 cm (67.01\").    Weight as of this encounter: 61.1 kg (134 lb 12.8 oz).    BMI is within normal parameters. No other follow-up for BMI required.      Does the " patient have evidence of cognitive impairment? Yes    Physical Exam  Vitals reviewed.   Constitutional:       Appearance: Normal appearance.   HENT:      Head: Normocephalic.   Eyes:      Pupils: Pupils are equal, round, and reactive to light.   Cardiovascular:      Rate and Rhythm: Normal rate and regular rhythm.      Heart sounds: No murmur heard.  Pulmonary:      Effort: Pulmonary effort is normal.      Breath sounds: Decreased air movement present. Rhonchi present.   Musculoskeletal:         General: Normal range of motion.   Neurological:      Mental Status: He is alert. Mental status is at baseline.   Psychiatric:         Mood and Affect: Mood normal.         Behavior: Behavior normal.       Lab Results   Component Value Date    CHLPL 157 08/11/2022    TRIG 121 08/11/2022    HDL 36 (L) 08/11/2022    LDL 99 08/11/2022    VLDL 22 08/11/2022            HEALTH RISK ASSESSMENT    Smoking Status:  Social History     Tobacco Use   Smoking Status Current Every Day Smoker   • Packs/day: 2.00   • Years: 50.00   • Pack years: 100.00   Smokeless Tobacco Never Used     Alcohol Consumption:  Social History     Substance and Sexual Activity   Alcohol Use Not Currently     Fall Risk Screen:    STEADI Fall Risk Assessment was completed, and patient is at LOW risk for falls.Assessment completed on:8/17/2022    Depression Screening:  PHQ-2/PHQ-9 Depression Screening 8/17/2022   Little Interest or Pleasure in Doing Things 0-->not at all   Feeling Down, Depressed or Hopeless 0-->not at all   PHQ-9: Brief Depression Severity Measure Score 0       Health Habits and Functional and Cognitive Screening:  Functional & Cognitive Status 8/17/2022   Do you have difficulty preparing food and eating? Yes   Do you have difficulty bathing yourself, getting dressed or grooming yourself? Yes   Do you have difficulty using the toilet? No   Do you have difficulty moving around from place to place? Yes   Do you have trouble with steps or getting  out of a bed or a chair? Yes   Current Diet Unhealthy Diet   Dental Exam Not up to date   Eye Exam Not up to date   Exercise (times per week) 0 times per week   Current Exercises Include No Regular Exercise   Do you need help using the phone?  No   Are you deaf or do you have serious difficulty hearing?  No   Do you need help with transportation? Yes   Do you need help shopping? Yes   Do you need help preparing meals?  No   Do you need help with housework?  No   Do you need help with laundry? No   Do you need help taking your medications? Yes   Do you need help managing money? Yes   Do you ever drive or ride in a car without wearing a seat belt? No   Have you felt unusual stress, anger or loneliness in the last month? No   Who do you live with? Alone   If you need help, do you have trouble finding someone available to you? Yes   Do you have difficulty concentrating, remembering or making decisions? No       Age-appropriate Screening Schedule:  Refer to the list below for future screening recommendations based on patient's age, sex and/or medical conditions. Orders for these recommended tests are listed in the plan section. The patient has been provided with a written plan.    Health Maintenance   Topic Date Due   • TDAP/TD VACCINES (1 - Tdap) Never done   • ZOSTER VACCINE (1 of 2) Never done   • INFLUENZA VACCINE  10/01/2022              Assessment & Plan   CMS Preventative Services Quick Reference  Risk Factors Identified During Encounter  Fall Risk-High or Moderate  Immunizations Discussed/Encouraged (specific Immunizations; Shingrix  Tobacco Use/Dependance (use dotphrase .tobaccocessation for documentation)  The above risks/problems have been discussed with the patient.  Follow up actions/plans if indicated are seen below in the Assessment/Plan Section.  Pertinent information has been shared with the patient in the After Visit Summary.    Diagnoses and all orders for this visit:    1. Medicare annual wellness  visit, subsequent (Primary)  Comments:  recommend protein supplements, smoking cessation, annual wellness visit , health maintenance recommendations discussed     2. Cigarette nicotine dependence with other nicotine-induced disorder  Comments:  Continue to encourage cessation-Bethany wishes to keep him on the Wellbutrin at this time  Orders:  -     Ambulatory Referral to Pulmonology  -     tiotropium bromide monohydrate (Spiriva Respimat) 2.5 MCG/ACT aerosol solution inhaler; Inhale 2 puffs Daily.  Dispense: 3 each; Refill: 1  -     guaiFENesin (Mucinex) 600 MG 12 hr tablet; Take 2 tablets by mouth 2 (Two) Times a Day.  Dispense: 14 tablet; Refill: 0  -      CT Chest Low Dose Cancer Screening WO; Future    3. Rock's esophagus with dysplasia  Comments:  We will schedule follow-up EGD and colonoscopy at his next appointment.    4. Dyspnea on exertion  Comments:  suspect COPD  will start on daily inhaler, have him follo wup with Pulmonary , continue with efforts at smoking cessation  - will schedule for CT lung screening  Orders:  -     guaiFENesin (Mucinex) 600 MG 12 hr tablet; Take 2 tablets by mouth 2 (Two) Times a Day.  Dispense: 14 tablet; Refill: 0    5. Adenomatous polyp of colon, unspecified part of colon  Comments:  need for follow up colonoscopy - will have after PSA work up complete    6. Weight loss, non-intentional  Comments:  Suspect multifactorial have encouraged protein shake addition and follow-up on some of his needed screenings    7. Intellectual disability    8. Encounter for immunization  -     Pneumococcal Conjugate Vaccine 20-Valent (PCV20)  -     COVID-19 Vaccine (Pfizer) Gray Cap        Follow Up:   Return in about 6 weeks (around 9/28/2022) for Recheck.     An After Visit Summary and PPPS were made available to the patient.

## 2022-08-29 ENCOUNTER — HOSPITAL ENCOUNTER (OUTPATIENT)
Dept: CT IMAGING | Facility: HOSPITAL | Age: 72
Discharge: HOME | End: 2022-08-29
Admitting: NURSE PRACTITIONER

## 2022-08-29 DIAGNOSIS — F17.218 CIGARETTE NICOTINE DEPENDENCE WITH OTHER NICOTINE-INDUCED DISORDER: ICD-10-CM

## 2022-08-29 PROCEDURE — 71271 CT THORAX LUNG CANCER SCR C-: CPT

## 2022-08-29 RX ORDER — AZITHROMYCIN 500 MG/1
TABLET, FILM COATED ORAL
COMMUNITY
Start: 2022-08-20 | End: 2022-08-31

## 2022-08-29 RX ORDER — LIDOCAINE HYDROCHLORIDE 20 MG/ML
SOLUTION OROPHARYNGEAL AS NEEDED
COMMUNITY
Start: 2022-08-20 | End: 2022-09-01

## 2022-08-30 ENCOUNTER — OFFICE VISIT (OUTPATIENT)
Dept: SURGERY | Facility: CLINIC | Age: 72
End: 2022-08-30

## 2022-08-30 ENCOUNTER — PREP FOR SURGERY (OUTPATIENT)
Dept: OTHER | Facility: HOSPITAL | Age: 72
End: 2022-08-30

## 2022-08-30 VITALS — BODY MASS INDEX: 21.03 KG/M2 | RESPIRATION RATE: 14 BRPM | WEIGHT: 134 LBS | HEIGHT: 67 IN

## 2022-08-30 DIAGNOSIS — K22.719 BARRETT'S ESOPHAGUS WITH DYSPLASIA: Primary | ICD-10-CM

## 2022-08-30 DIAGNOSIS — R13.19 ESOPHAGEAL DYSPHAGIA: ICD-10-CM

## 2022-08-30 PROCEDURE — 99203 OFFICE O/P NEW LOW 30 MIN: CPT | Performed by: SURGERY

## 2022-08-30 NOTE — PROGRESS NOTES
Inpatient History and Physical Surgical Orders    Preadmission Location:   Preadmission Time:  Facility:  Surgery Date:  Surgery Time:  Preadmission Test date:     Chief Complaint  Outpatient History and Physical / Surgical Orders    Primary Care Provider: Ashley Robertson APRN    Referring Provider: Ashley Robertson APRN    Subjective      Patient Name: Tristan Bolivar : 1950    HPI  The patient is a 72-year-old gentleman who has a known history of Rock's esophagus and he was noted on a biopsy back in 2019 to have some low-grade dysplasia.  Recently he has began to have a lot of pain with swallowing and had a recent CT scan of his chest that questioned a possible esophageal mass.    Past History:  Medical History: has a past medical history of Rock's esophagus, Colon polyp, Renal stones, and Tremor.   Surgical History: has a past surgical history that includes Hip surgery.   Family History: family history includes Alzheimer's disease in an other family member; Diabetes type II in an other family member; Heart attack in an other family member.   Social History: reports that he has been smoking. He has a 100.00 pack-year smoking history. He has never used smokeless tobacco. He reports previous alcohol use. He reports that he does not use drugs.  Allergies: Patient has no known allergies.       Current Outpatient Medications:   •  albuterol sulfate  (90 Base) MCG/ACT inhaler, Inhale 2 puffs Every 4 (Four) Hours As Needed for Wheezing., Disp: 8 g, Rfl: 0  •  azithromycin (ZITHROMAX) 500 MG tablet, , Disp: , Rfl:   •  buPROPion SR (WELLBUTRIN SR) 150 MG 12 hr tablet, TAKE 1 TABLET TWICE DAILY, Disp: 180 tablet, Rfl: 0  •  Cholecalciferol 10 MCG (400 UNIT) capsule, Vitamin D3 400 unit oral capsule take 1 capsule by oral route daily   Active, Disp: , Rfl:   •  guaiFENesin (Mucinex) 600 MG 12 hr tablet, Take 2 tablets by mouth 2 (Two) Times a Day., Disp: 14 tablet, Rfl: 0  •  Lidocaine Viscous HCl  "(XYLOCAINE) 2 % solution, , Disp: , Rfl:   •  multivitamin with minerals tablet tablet, Take 1 tablet by mouth Daily., Disp: , Rfl:   •  Omega-3 Fatty Acids (fish oil) 1000 MG capsule capsule, Fish Oil 120-180 mg oral capsule take 1 capsule by oral route daily   Active, Disp: , Rfl:   •  primidone (MYSOLINE) 250 MG tablet, TAKE 1 TABLET BY MOUTH UP TO THREE TIMES DAILY, Disp: 90 tablet, Rfl: 0  •  tiotropium bromide monohydrate (Spiriva Respimat) 2.5 MCG/ACT aerosol solution inhaler, Inhale 2 puffs Daily., Disp: 3 each, Rfl: 1       Objective   Vital Signs:   Resp 14   Ht 170.2 cm (67.01\")   Wt 60.8 kg (134 lb)   BMI 20.98 kg/m²       Physical Exam  Vitals and nursing note reviewed.   Constitutional:       Appearance: Normal appearance. The patient is well-developed.   Cardiovascular:      Rate and Rhythm: Normal rate and regular rhythm.   Pulmonary:      Effort: Pulmonary effort is normal.      Breath sounds: Normal air entry.   Abdominal:      General: Bowel sounds are normal.      Palpations: Abdomen is soft.      Skin:     General: Skin is warm and dry.   Neurological:      Mental Status: The patient is alert and oriented to person, place, and time.      Motor: Motor function is intact.   Psychiatric:         Mood and Affect: Mood normal.       Result Review :               Assessment and Plan   Diagnoses and all orders for this visit:    1. Rock's esophagus with dysplasia (Primary)    2. Esophageal dysphagia    We will schedule him for an EGD to rule out esophageal cancer.  I have described that procedure to him as well as the risk and benefits and he is agreeable to proceeding.    I  Eduardo Bustamante MD  08/30/2022    "

## 2022-08-31 ENCOUNTER — OFFICE VISIT (OUTPATIENT)
Dept: UROLOGY | Facility: CLINIC | Age: 72
End: 2022-08-31

## 2022-08-31 VITALS — HEIGHT: 67 IN | RESPIRATION RATE: 16 BRPM | WEIGHT: 135.6 LBS | BODY MASS INDEX: 21.28 KG/M2

## 2022-08-31 DIAGNOSIS — R97.20 ELEVATED PROSTATE SPECIFIC ANTIGEN (PSA): Primary | ICD-10-CM

## 2022-08-31 LAB
BILIRUB BLD-MCNC: NEGATIVE MG/DL
CLARITY, POC: CLEAR
COLOR UR: YELLOW
EXPIRATION DATE: ABNORMAL
GLUCOSE UR STRIP-MCNC: NEGATIVE MG/DL
KETONES UR QL: NEGATIVE
LEUKOCYTE EST, POC: ABNORMAL
Lab: ABNORMAL
NITRITE UR-MCNC: NEGATIVE MG/ML
PH UR: 6.5 [PH] (ref 5–8)
PROT UR STRIP-MCNC: NEGATIVE MG/DL
RBC # UR STRIP: NEGATIVE /UL
SP GR UR: 1.02 (ref 1–1.03)
UROBILINOGEN UR QL: ABNORMAL

## 2022-08-31 PROCEDURE — 99203 OFFICE O/P NEW LOW 30 MIN: CPT | Performed by: UROLOGY

## 2022-08-31 PROCEDURE — 81003 URINALYSIS AUTO W/O SCOPE: CPT | Performed by: UROLOGY

## 2022-09-01 ENCOUNTER — TELEPHONE (OUTPATIENT)
Dept: UROLOGY | Facility: CLINIC | Age: 72
End: 2022-09-01

## 2022-09-01 NOTE — TELEPHONE ENCOUNTER
Called back to give the appt information and Bethany had left to go see the PT. I left the appt information with her , Jose. MRI is scheduled at [facility] location on September 13 at 1:40 with a 1:10 arrival time. I instructed him that the PT should not wear any metal or jewelry to the appt. I said that if they had any questions to call us back at the office and gave him our number. He voiced understanding.

## 2022-09-01 NOTE — PRE-PROCEDURE INSTRUCTIONS
Pt. Instructed on NPO after midnight, pre-op meds. May take Tylenol if needed,no other over the counter pain relievers. No vitamins or supplements.        Sister states pt. answers questions very slowly. Give him some time to answer

## 2022-09-01 NOTE — TELEPHONE ENCOUNTER
Spoke with Bethany and asked for dates to schedule PT's MRI prostate. I also asked if she wanted to go to a particular [facility] location. She said she didn't have a preference. I told her I would call her back once I had it scheduled.

## 2022-09-02 ENCOUNTER — ANESTHESIA (OUTPATIENT)
Dept: GASTROENTEROLOGY | Facility: HOSPITAL | Age: 72
End: 2022-09-02

## 2022-09-02 ENCOUNTER — HOSPITAL ENCOUNTER (OUTPATIENT)
Facility: HOSPITAL | Age: 72
Setting detail: HOSPITAL OUTPATIENT SURGERY
Discharge: HOME | End: 2022-09-02
Attending: SURGERY | Admitting: SURGERY

## 2022-09-02 ENCOUNTER — ANESTHESIA EVENT (OUTPATIENT)
Dept: GASTROENTEROLOGY | Facility: HOSPITAL | Age: 72
End: 2022-09-02

## 2022-09-02 VITALS
HEART RATE: 82 BPM | TEMPERATURE: 97.4 F | WEIGHT: 134.92 LBS | DIASTOLIC BLOOD PRESSURE: 74 MMHG | BODY MASS INDEX: 21.13 KG/M2 | RESPIRATION RATE: 19 BRPM | OXYGEN SATURATION: 94 % | SYSTOLIC BLOOD PRESSURE: 141 MMHG

## 2022-09-02 DIAGNOSIS — K22.719 BARRETT'S ESOPHAGUS WITH DYSPLASIA: ICD-10-CM

## 2022-09-02 PROCEDURE — 25010000002 PROPOFOL 10 MG/ML EMULSION: Performed by: NURSE ANESTHETIST, CERTIFIED REGISTERED

## 2022-09-02 PROCEDURE — 88305 TISSUE EXAM BY PATHOLOGIST: CPT | Performed by: SURGERY

## 2022-09-02 PROCEDURE — 88360 TUMOR IMMUNOHISTOCHEM/MANUAL: CPT | Performed by: SURGERY

## 2022-09-02 RX ORDER — PROPOFOL 10 MG/ML
VIAL (ML) INTRAVENOUS AS NEEDED
Status: DISCONTINUED | OUTPATIENT
Start: 2022-09-02 | End: 2022-09-02 | Stop reason: SURG

## 2022-09-02 RX ORDER — IPRATROPIUM BROMIDE AND ALBUTEROL SULFATE 2.5; .5 MG/3ML; MG/3ML
3 SOLUTION RESPIRATORY (INHALATION) ONCE
Status: COMPLETED | OUTPATIENT
Start: 2022-09-02 | End: 2022-09-02

## 2022-09-02 RX ORDER — ONDANSETRON 2 MG/ML
4 INJECTION INTRAMUSCULAR; INTRAVENOUS ONCE AS NEEDED
Status: DISCONTINUED | OUTPATIENT
Start: 2022-09-02 | End: 2022-09-02 | Stop reason: HOSPADM

## 2022-09-02 RX ORDER — LIDOCAINE HYDROCHLORIDE 20 MG/ML
INJECTION, SOLUTION EPIDURAL; INFILTRATION; INTRACAUDAL; PERINEURAL AS NEEDED
Status: DISCONTINUED | OUTPATIENT
Start: 2022-09-02 | End: 2022-09-02 | Stop reason: SURG

## 2022-09-02 RX ORDER — SODIUM CHLORIDE, SODIUM LACTATE, POTASSIUM CHLORIDE, CALCIUM CHLORIDE 600; 310; 30; 20 MG/100ML; MG/100ML; MG/100ML; MG/100ML
30 INJECTION, SOLUTION INTRAVENOUS CONTINUOUS
Status: DISCONTINUED | OUTPATIENT
Start: 2022-09-02 | End: 2022-09-02 | Stop reason: HOSPADM

## 2022-09-02 RX ORDER — ONDANSETRON 4 MG/1
4 TABLET, FILM COATED ORAL ONCE AS NEEDED
Status: DISCONTINUED | OUTPATIENT
Start: 2022-09-02 | End: 2022-09-02 | Stop reason: HOSPADM

## 2022-09-02 RX ADMIN — PROPOFOL 50 MG: 10 INJECTION, EMULSION INTRAVENOUS at 13:35

## 2022-09-02 RX ADMIN — LIDOCAINE HYDROCHLORIDE 50 MG: 20 INJECTION, SOLUTION EPIDURAL; INFILTRATION; INTRACAUDAL; PERINEURAL at 13:35

## 2022-09-02 RX ADMIN — PROPOFOL 50 MG: 10 INJECTION, EMULSION INTRAVENOUS at 13:36

## 2022-09-02 RX ADMIN — SODIUM CHLORIDE, POTASSIUM CHLORIDE, SODIUM LACTATE AND CALCIUM CHLORIDE: 600; 310; 30; 20 INJECTION, SOLUTION INTRAVENOUS at 13:38

## 2022-09-02 RX ADMIN — PROPOFOL 50 MG: 10 INJECTION, EMULSION INTRAVENOUS at 13:39

## 2022-09-02 RX ADMIN — IPRATROPIUM BROMIDE AND ALBUTEROL SULFATE 3 ML: 2.5; .5 SOLUTION RESPIRATORY (INHALATION) at 13:12

## 2022-09-02 NOTE — ANESTHESIA PREPROCEDURE EVALUATION
Anesthesia Evaluation     Patient summary reviewed and Nursing notes reviewed   no history of anesthetic complications:  NPO Solid Status: > 8 hours  NPO Liquid Status: > 2 hours           Airway   Mallampati: I  TM distance: >3 FB  Neck ROM: full  No difficulty expected  Dental      Pulmonary    (+) a smoker (2ppd), COPD, decreased breath sounds,   Cardiovascular - normal exam  Exercise tolerance: poor (<4 METS)    Rhythm: regular  Rate: normal        Neuro/Psych  (+) tremors,    GI/Hepatic/Renal/Endo - negative ROS     ROS Comment: Rock's     Musculoskeletal     Abdominal    Substance History      OB/GYN          Other        ROS/Med Hx Other: PAT Nursing Notes unavailable.                   Anesthesia Plan    ASA 3     general   total IV anesthesia    Anesthetic plan, risks, benefits, and alternatives have been provided, discussed and informed consent has been obtained with: patient.        CODE STATUS:

## 2022-09-02 NOTE — ANESTHESIA POSTPROCEDURE EVALUATION
Patient: Tristan Bolivar    Procedure Summary     Date: 09/02/22 Room / Location: [facility] ENDOSCOPY 1 / [facility] ENDOSCOPY    Anesthesia Start: 1332 Anesthesia Stop: 1352    Procedure: ESOPHAGOGASTRODUODENOSCOPY WITH BIOPSIES (N/A ) Diagnosis:       Rock's esophagus with dysplasia      (Rock's esophagus with dysplasia [K22.719])    Surgeons: Eduardo Bustamante MD Provider: Shreyas Lucas MD    Anesthesia Type: general ASA Status: 3          Anesthesia Type: general    Vitals  Vitals Value Taken Time   /88 09/02/22 1418   Temp 36.3 °C (97.4 °F) 09/02/22 1413   Pulse 81 09/02/22 1419   Resp 19 09/02/22 1413   SpO2 95 % 09/02/22 1419   Vitals shown include unvalidated device data.        Post Anesthesia Care and Evaluation    Patient location during evaluation: bedside  Patient participation: complete - patient participated  Level of consciousness: awake  Pain score: 0  Pain management: adequate    Airway patency: patent  Anesthetic complications: No anesthetic complications  PONV Status: none  Cardiovascular status: acceptable and stable  Respiratory status: acceptable and room air  Hydration status: acceptable    Comments: An Anesthesiologist personally participated in the most demanding procedures (including induction and emergence if applicable) in the anesthesia plan, monitored the course of anesthesia administration at frequent intervals and remained physically present and available for immediate diagnosis and treatment of emergencies.

## 2022-09-02 NOTE — ANESTHESIA POSTPROCEDURE EVALUATION
Patient: Tristan Bolivar    Procedure Summary     Date: 09/02/22 Room / Location: [facility] ENDOSCOPY 1 / [facility] ENDOSCOPY    Anesthesia Start: 1332 Anesthesia Stop: 1352    Procedure: ESOPHAGOGASTRODUODENOSCOPY WITH BIOPSIES (N/A ) Diagnosis:       Rock's esophagus with dysplasia      (Rock's esophagus with dysplasia [K22.719])    Surgeons: Eduardo Bustamante MD Provider: Shreyas Lucas MD    Anesthesia Type: general ASA Status: 3          Anesthesia Type: general    Vitals  Vitals Value Taken Time   /88 09/02/22 1418   Temp 36.3 °C (97.4 °F) 09/02/22 1413   Pulse 81 09/02/22 1419   Resp 19 09/02/22 1413   SpO2 95 % 09/02/22 1419   Vitals shown include unvalidated device data.        Post Anesthesia Care and Evaluation    Patient location during evaluation: bedside  Patient participation: complete - patient participated  Level of consciousness: awake  Pain score: 0  Pain management: adequate    Airway patency: patent  Anesthetic complications: No anesthetic complications  PONV Status: none  Cardiovascular status: acceptable and stable  Respiratory status: acceptable and room air  Hydration status: acceptable    Comments: An Anesthesiologist personally participated in the most demanding procedures (including induction and emergence if applicable) in the anesthesia plan, monitored the course of anesthesia administration at frequent intervals and remained physically present and available for immediate diagnosis and treatment of emergencies.

## 2022-09-06 ENCOUNTER — TELEPHONE (OUTPATIENT)
Dept: FAMILY MEDICINE CLINIC | Age: 72
End: 2022-09-06

## 2022-09-06 NOTE — TELEPHONE ENCOUNTER
Caller: SHO BERNARDO    Relationship: Emergency Contact    Best call back number: [phone]    What is the best time to reach you: ANYTIME     Who are you requesting to speak with (clinical staff, provider,  specific staff member): NURSE         What was the call regarding: PATIENT'S SISTER STATES THAT BOTH OF ADORNO BROTHERS HAVE PROSTATE PROBLEMS AND THEY TAKE FLOMAX, PATIENT'S SISTER IS REQUESTEING A PRESCRIPTION OF FLOMAX FOR RYNE.     Do you require a callback: YES

## 2022-09-06 NOTE — TELEPHONE ENCOUNTER
Bethany inf and she said pt is in a lot of pain right now it is taking him up to 30 minutes to void . I advised her to call Dr Montejo's office and inf them of voiding issues

## 2022-09-06 NOTE — TELEPHONE ENCOUNTER
Please let her know that this is something that I think she should discuss with Dr. Montejo if this is appropriate to start at this time.  I would defer until after the MRI has been completed and discuss this with Urology

## 2022-09-07 ENCOUNTER — TELEPHONE (OUTPATIENT)
Dept: SURGERY | Facility: CLINIC | Age: 72
End: 2022-09-07

## 2022-09-07 ENCOUNTER — TELEPHONE (OUTPATIENT)
Dept: UROLOGY | Facility: CLINIC | Age: 72
End: 2022-09-07

## 2022-09-07 DIAGNOSIS — R97.20 ELEVATED PROSTATE SPECIFIC ANTIGEN (PSA): Primary | ICD-10-CM

## 2022-09-07 DIAGNOSIS — N40.1 BENIGN PROSTATIC HYPERPLASIA WITH URINARY RETENTION: ICD-10-CM

## 2022-09-07 DIAGNOSIS — R33.8 BENIGN PROSTATIC HYPERPLASIA WITH URINARY RETENTION: ICD-10-CM

## 2022-09-07 RX ORDER — TAMSULOSIN HYDROCHLORIDE 0.4 MG/1
1 CAPSULE ORAL DAILY
Qty: 90 CAPSULE | Refills: 3 | Status: SHIPPED | OUTPATIENT
Start: 2022-09-07 | End: 2023-02-14 | Stop reason: SDUPTHER

## 2022-09-07 NOTE — TELEPHONE ENCOUNTER
Patient's sister, Bethany, called.  She said patient is mentally handicap.  She said he is having a lot of difficulty urinating, and that it takes him 1/2 hour sometimes to pee.    He has 2 brothers who have prostate issues, and she said they both take Flomax.  She said one of them gave him some Flomax and he has been taking it about 3 days.  She asked if Dr. Montejo will send a prescription for Flomax to [facility] in [place].

## 2022-09-07 NOTE — TELEPHONE ENCOUNTER
Spoke with Bethany and told her that I told Dr. Montejo her request and she ordered the Flomax 0.4 mg to be taken daily and it was sent to the [facility] pharmacy in [place]. She voiced understanding.

## 2022-09-07 NOTE — TELEPHONE ENCOUNTER
Patient's sister, Bethany, called.  She said patient is mentally handicapped.  She asked for EGD results, and said that she was to get them today.

## 2022-09-08 ENCOUNTER — TELEPHONE (OUTPATIENT)
Dept: SURGERY | Facility: CLINIC | Age: 72
End: 2022-09-08

## 2022-09-08 LAB
CYTO UR: NORMAL
LAB AP CASE REPORT: NORMAL
LAB AP CLINICAL INFORMATION: NORMAL
LAB AP SPECIAL STAINS: NORMAL
PATH REPORT.FINAL DX SPEC: NORMAL
PATH REPORT.GROSS SPEC: NORMAL

## 2022-09-08 NOTE — TELEPHONE ENCOUNTER
Per Pathology the patient has a malignant diagnosis. Esophageal mass bx with adenocarcinoma, well to moderately differentiated.

## 2022-09-09 DIAGNOSIS — G25.0 ESSENTIAL TREMOR: ICD-10-CM

## 2022-09-12 PROBLEM — C15.9 ADENOCARCINOMA OF ESOPHAGUS: Status: ACTIVE | Noted: 2022-09-12

## 2022-09-12 RX ORDER — PRIMIDONE 250 MG/1
TABLET ORAL
Qty: 90 TABLET | Refills: 3 | Status: SHIPPED | OUTPATIENT
Start: 2022-09-12

## 2022-09-12 NOTE — TELEPHONE ENCOUNTER
[facility] called with an appt for the patient this Friday, 9/16 at 7:30am. They are going to call and notify the patient.

## 2022-09-14 ENCOUNTER — OFFICE VISIT (OUTPATIENT)
Dept: FAMILY MEDICINE CLINIC | Age: 72
End: 2022-09-14

## 2022-09-14 VITALS
HEART RATE: 93 BPM | DIASTOLIC BLOOD PRESSURE: 77 MMHG | BODY MASS INDEX: 20.88 KG/M2 | HEIGHT: 67 IN | TEMPERATURE: 98 F | SYSTOLIC BLOOD PRESSURE: 124 MMHG | WEIGHT: 133 LBS | OXYGEN SATURATION: 93 %

## 2022-09-14 DIAGNOSIS — R39.16 BENIGN PROSTATIC HYPERPLASIA (BPH) WITH STRAINING ON URINATION: Primary | ICD-10-CM

## 2022-09-14 DIAGNOSIS — R63.4 WEIGHT LOSS, NON-INTENTIONAL: ICD-10-CM

## 2022-09-14 DIAGNOSIS — C15.9 ADENOCARCINOMA OF ESOPHAGUS: ICD-10-CM

## 2022-09-14 DIAGNOSIS — N40.1 BENIGN PROSTATIC HYPERPLASIA (BPH) WITH STRAINING ON URINATION: Primary | ICD-10-CM

## 2022-09-14 DIAGNOSIS — F17.218 CIGARETTE NICOTINE DEPENDENCE WITH OTHER NICOTINE-INDUCED DISORDER: ICD-10-CM

## 2022-09-14 PROCEDURE — 99213 OFFICE O/P EST LOW 20 MIN: CPT | Performed by: NURSE PRACTITIONER

## 2022-09-14 NOTE — PROGRESS NOTES
Assessment and Plan   Diagnoses and all orders for this visit:    1. Benign prostatic hyperplasia (BPH) with straining on urination (Primary)  Comments:  I have encouraged Emir and shown to follow-up with urology as recommended    2. Cigarette nicotine dependence with other nicotine-induced disorder  Comments:  Again we discussed smoking cessation and he is not interested at this time    3. Adenocarcinoma of esophagus (HCC)  Comments:  I have encouraged Emir to eat softer foods and avoid rough textured such as crackers to improve the discomfort level.  He will talk further with Dr. Wong    4. Weight loss, non-intentional  Comments:  Encouraged to eat high-protein as well as adding a protein shake                  Follow Up   Return for Next scheduled follow up.    Chief Complaint  Tristan Bolivar presents to [facility] for Follow-up (6 week f/u from Sore throat; Dysphagia, unspecified type; Cough. Pt has cancer in Esophagus)    Subjective          History of Present Illness  Friday is here with his Sister Bethany today to follow-up on recent diagnosis of esophageal cancer.  He is scheduled to follow-up with Dr. Wong regarding this diagnosis on this Friday 9/16 for discussion of treatment and management.  Emir continues to lose weight.  As far as eating and swallowing, Emir reports that he does have pain with swallowing.  He reports that mostly what he has been eating he is crackers and peanut butter.  He reports that drinking thin liquids does not cause any discomfort or has not noticed any food getting stuck in his throat.  Bethany his sister has been trying to encourage him to drink protein shakes however he continues to avoid.  He is continuing to lose weight.    Emir also reports that he is still having difficulty with urination.  He did have an MRI completed through urology yesterday and has not had any consultation regarding the results of this.  Bethany reports that there is no  "follow-up scheduled with urology at this time.  He has been on the tamsulosin for about 2 weeks and from Emir's perspective he has not seen any improvement in his symptoms up to this point.        Review of Systems    Objective     Vitals:    09/14/22 1119   BP: 124/77   BP Location: Right arm   Patient Position: Sitting   Cuff Size: Adult   Pulse: 93   Temp: 98 °F (36.7 °C)   TempSrc: Oral   SpO2: 93%   Weight: 60.3 kg (133 lb)   Height: 170.2 cm (67.01\")     Body mass index is 20.82 kg/m².     Physical Exam  Vitals reviewed.   Constitutional:       Appearance: Normal appearance.   HENT:      Head: Normocephalic.   Eyes:      Pupils: Pupils are equal, round, and reactive to light.   Cardiovascular:      Rate and Rhythm: Normal rate and regular rhythm.      Heart sounds: No murmur heard.  Pulmonary:      Effort: Pulmonary effort is normal.      Breath sounds: Normal breath sounds.   Musculoskeletal:         General: Normal range of motion.   Neurological:      Mental Status: He is alert.   Psychiatric:         Mood and Affect: Mood normal.         Behavior: Behavior normal.         Result Review                        No Known Allergies   Past Medical History:   Diagnosis Date   • Rock's esophagus    • Colon polyp    • History of kidney stones    • Mental deficiency     Responds very slowly to answer questions   • Renal stones    • Smokers' cough (HCC)    • Sore throat    • Tremor      Current Outpatient Medications   Medication Sig Dispense Refill   • albuterol sulfate  (90 Base) MCG/ACT inhaler Inhale 2 puffs Every 4 (Four) Hours As Needed for Wheezing. 8 g 0   • buPROPion SR (WELLBUTRIN SR) 150 MG 12 hr tablet TAKE 1 TABLET TWICE DAILY 180 tablet 0   • Cholecalciferol 10 MCG (400 UNIT) capsule Vitamin D3 400 unit oral capsule take 1 capsule by oral route daily   Active     • guaiFENesin (Mucinex) 600 MG 12 hr tablet Take 2 tablets by mouth 2 (Two) Times a Day. 14 tablet 0   • multivitamin with minerals " tablet tablet Take 1 tablet by mouth Daily.     • Omega-3 Fatty Acids (fish oil) 1000 MG capsule capsule Fish Oil 120-180 mg oral capsule take 1 capsule by oral route daily   Active     • primidone (MYSOLINE) 250 MG tablet TAKE 1 TABLET BY MOUTH UP TO THREE TIMES DAILY 90 tablet 3   • tamsulosin (FLOMAX) 0.4 MG capsule 24 hr capsule Take 1 capsule by mouth Daily for 360 days. 90 capsule 3   • tiotropium bromide monohydrate (Spiriva Respimat) 2.5 MCG/ACT aerosol solution inhaler Inhale 2 puffs Daily. 3 each 1     No current facility-administered medications for this visit.     Past Surgical History:   Procedure Laterality Date   • ENDOSCOPY N/A 9/2/2022    Procedure: ESOPHAGOGASTRODUODENOSCOPY WITH BIOPSIES;  Surgeon: Eduardo Bustamante MD;  Location: [facility] ENDOSCOPY;  Service: General;  Laterality: N/A;  ESOPHAGEAL MASS   • FEMUR SURGERY      earle placed   • KIDNEY STONE SURGERY        Health Maintenance Due   Topic Date Due   • TDAP/TD VACCINES (1 - Tdap) Never done   • ZOSTER VACCINE (1 of 2) Never done   • COLORECTAL CANCER SCREENING  03/08/2021   • COVID-19 Vaccine (4 - Booster for Pfizer series) 05/05/2022      Immunization History   Administered Date(s) Administered   • COVID-19 (PFIZER) PURPLE CAP 03/27/2021, 04/17/2021, 01/05/2022   • Fluzone High Dose =>65 Years ([employer] ONLY) 09/14/2017   • Pneumococcal Conjugate 13-Valent (PCV13) 01/15/2018   • Pneumococcal Conjugate 20-Valent (PCV20) 08/17/2022

## 2022-09-19 ENCOUNTER — TELEPHONE (OUTPATIENT)
Dept: UROLOGY | Facility: CLINIC | Age: 72
End: 2022-09-19

## 2022-09-19 ENCOUNTER — TELEPHONE (OUTPATIENT)
Dept: SURGERY | Facility: CLINIC | Age: 72
End: 2022-09-19

## 2022-09-19 DIAGNOSIS — R97.20 ELEVATED PROSTATE SPECIFIC ANTIGEN (PSA): Primary | ICD-10-CM

## 2022-09-19 NOTE — TELEPHONE ENCOUNTER
Patient's sister, Bethany Blevins, called.  Patient has appointment on 09/20/22 with Dr. Bustamante, and a PET scan is scheduled at the same time.  She wants to cancel the appointment with Dr. Bustamante, and wants to know if it needs to be rescheduled, since he has been referred to Dr. Wong.    T

## 2022-09-19 NOTE — TELEPHONE ENCOUNTER
Spoke with pt caregiver, Bethany Felicity with negative MRI results. She informed me that patient has been diagnosised with Esophageal cancer. Per Dr. Montejo we will check his PSA in 3 months since his MRI was negative.

## 2022-09-19 NOTE — TELEPHONE ENCOUNTER
Can you please c.x pt appointment and let them know since they have been referred out they do not need to be seen tomorrow by Dr. Bustamante

## 2022-09-23 ENCOUNTER — HOSPITAL ENCOUNTER (OUTPATIENT)
Dept: HOSPITAL 49 - FAS | Age: 72
Discharge: HOME | End: 2022-09-23
Attending: SURGERY
Payer: COMMERCIAL

## 2022-09-23 VITALS — WEIGHT: 134 LBS | BODY MASS INDEX: 22.88 KG/M2 | HEIGHT: 64 IN

## 2022-09-23 DIAGNOSIS — C15.4: Primary | ICD-10-CM

## 2022-09-23 DIAGNOSIS — I87.2: ICD-10-CM

## 2022-09-23 DIAGNOSIS — R63.30: ICD-10-CM

## 2022-09-23 LAB
ALBUMIN SERPL-MCNC: 2.8 G/DL (ref 3.4–5)
ALKALINE PHOSHATASE: 194 U/L (ref 46–116)
ALT SERPL-CCNC: 30 U/L (ref 16–63)
AST: 27 U/L (ref 15–37)
BILIRUBIN - TOTAL: 0.5 MG/DL (ref 0.2–1)
BUN SERPL-MCNC: 7 MG/DL (ref 7–18)
BUN/CREAT RATIO (CALC): 8.4 RATIO
CHLORIDE: 104 MMOL/L (ref 98–107)
CO2 (BICARBONATE): 28 MMOL/L (ref 21–32)
CREATININE: 0.83 MG/DL (ref 0.67–1.17)
GLOBULIN (CALCULATION): 3.3 G/DL
GLUCOSE SERPL-MCNC: 92 MG/DL (ref 74–106)
HCT: 43.3 % (ref 42–52)
HGB BLD-MCNC: 14.7 G/DL (ref 13.2–18)
MCH RBC QN AUTO: 33 PG (ref 25–31)
MCHC RBC AUTO-ENTMCNC: 33.9 G/DL (ref 32–36)
MCV: 97.3 FL (ref 78–100)
MPV: 9.4 FL (ref 6–9.5)
PLT: 287 K/UL (ref 150–400)
POTASSIUM: 3.8 MMOL/L (ref 3.5–5.1)
RBC: 4.45 M/UL (ref 4.7–6)
RDW: 13.3 % (ref 11.5–14)
TOTAL PROTEIN: 6.1 G/DL (ref 6.4–8.2)
WBC: 8.6 K/UL (ref 4–10.5)

## 2022-09-23 PROCEDURE — C1788 PORT, INDWELLING, IMP: HCPCS

## 2022-09-26 ENCOUNTER — TELEPHONE (OUTPATIENT)
Dept: UROLOGY | Facility: CLINIC | Age: 72
End: 2022-09-26

## 2022-09-26 NOTE — TELEPHONE ENCOUNTER
Spoke with Ms. Jenkins, informing her that it can take up to 3 months for this medication to work. She will call back with any issues.

## 2022-09-26 NOTE — TELEPHONE ENCOUNTER
Patient sister called in to say that the medication Dr Montejo gave patient is not working.  Patient still having a great deal of difficulty urinating.

## 2022-10-07 ENCOUNTER — TELEPHONE (OUTPATIENT)
Dept: FAMILY MEDICINE CLINIC | Age: 72
End: 2022-10-07

## 2022-10-07 NOTE — TELEPHONE ENCOUNTER
Caller: Tristan Bolivar    Relationship: Self    Best call back number: [phone]    What is the best time to reach you: ANYTIME PLEASE LEAVE VM IF NOT ANSWER         What was the call regarding: PATIENT HAS CANCER OF THE ESOPHAGUS AND IS HAVING TROUBLE TAKING HIS MEDICINE WITHOUT SPITTING BACK UP. HE IS WANTING TO KNOW IF THERE IS A LIQUID OR POWDER FORM HE CAN TAKE THROUGH HIS FEEDING TUBE.     Do you require a callback: YES

## 2022-10-07 NOTE — TELEPHONE ENCOUNTER
Lmtrc    HUB TO READ:     Which medication is patient having difficulty with taking?   PER VORYAMILETH from Dr. Ankur Dominguez 2 MG DAILY. TAKE 1 CAP BY MOUTH DAILY.  DISP 20; REFILL 0    [facility] # [id number]

## 2022-10-07 NOTE — TELEPHONE ENCOUNTER
Caller: Tristan Bolivar    Relationship: Self    Best call back number: [phone]    What is the best time to reach you: ANY    Who are you requesting to speak with (clinical staff, provider,  specific staff member): CLINICAL    Do you know the name of the person who called: MELISSA    What was the call regarding: PATIENT CALLED STATING THAT SHE WAS TOLD TO CALL BACK AND GIVE THE NAMES OF MEDICATIONS THAT ARE NOT WORKING WELL WITH THE PATIENT:  WELLBUTRIN  FLOMAX  MULTI-VITAMIN  FISH OIL  ANTIBIOTICS  PRIMIDONE    Do you require a callback: YES

## 2022-10-10 ENCOUNTER — TELEPHONE (OUTPATIENT)
Dept: FAMILY MEDICINE CLINIC | Age: 72
End: 2022-10-10

## 2022-10-10 NOTE — TELEPHONE ENCOUNTER
Caller: SHO BERNARDO    Relationship to patient: Emergency Contact    Best call back number: [phone]    Patient is needing: PATIENT'S SISTER CALLED STATING PATIENT IS UNABLE TO SWALLOW HIS PILLS. PATIENT'S SISTER HAS TAKEN THE PATIENT DOWN TO 1 buPROPion SR (WELLBUTRIN SR) 150 MG 12 hr tablet PER DAY AND WOULD LIKE TO KNOW WHAT ELSE TO DO.     PATIENT'S SISTER SAID THAT [facility] NEEDS AN ORDER PUT IN TO DO AN ENEMA ON THE PATIENT ASAP.

## 2022-10-10 NOTE — TELEPHONE ENCOUNTER
Caller: SHO BERNARDO    Relationship to patient: Emergency Contact    Best call back number:[phone]    Patient is needing: PATIENT'S SISTER CALLED IN AND SAID THAT PATIENT IS IS A LOT OF DISCOMFORT AND THAT HE IS NEEDING A NURSE TO COME TO HIS HOUSE AND ADMINISTER AN ENEMA. PLEASE CALL AND ADVISE.        [facility] [address] [place] [address] - [address] [facility] at [phone] [facility] [phone]   [phone]

## 2022-10-11 NOTE — TELEPHONE ENCOUNTER
I called [facility] and let them know that I have not been able to reach pt's sister Bethany they have not jones able to go out and see him due to insurance they plan on seeing him Thursday . Per [facility] the family has not called them either  But they can go out tomorrow and check on him because insurance just opened up they gave me another number number [phone] I called and had to leave a message

## 2022-10-11 NOTE — TELEPHONE ENCOUNTER
Spoke with [facility] and I gave a list of recommendations to MM she said she will reach out to Neuro and uro and give me information to give to sister.lmtrc for sister

## 2022-10-12 NOTE — TELEPHONE ENCOUNTER
Sister inf that MM is reviewing meds to see what could be changed to liquid and MM will let us know .

## 2022-10-12 NOTE — TELEPHONE ENCOUNTER
Sister reports that pt is fine she gave him a fleets enema and he had a good BM yesterday . I informed her that we have been trying to reach her and have been unsuccessful she said they are having trouble with there phone . I told her home health would be out today for a visit .

## 2022-10-12 NOTE — TELEPHONE ENCOUNTER
Lois nurse with [facility] called and they have been trying to reach the pt to go out and do a skilled nursing visit and they have been unsuccessful all day the sister called and informed them that pt is having abd pain and has given himself an enema . They instructed her to take him to the er and they are refusing to go I called [phone] I had to leave a message , I called the [phone] and also had to leave a message I called [facility]  And spoke with Lois and let her know that I spoke with sister and she reported a good bm yesterday and that he was fine this am per her report. Lois said that she told her the total opposite that he had given his self an enema yesterday and did not have a bm and was having severe abd pain today. I advised her to take him to the ER she agreed to do so I also informed her that we have had great difficulty reaching her today . I asked if there was someone else that we could call and speak to regarding Emir's care and she said yes her sister Tracey Alonzo [phone]. I informed Lois at [facility]

## 2022-10-13 ENCOUNTER — TELEPHONE (OUTPATIENT)
Dept: FAMILY MEDICINE CLINIC | Age: 72
End: 2022-10-13

## 2022-10-13 NOTE — TELEPHONE ENCOUNTER
Caller: SHO BERNARDO    Relationship: Emergency Contact    Best call back number: [phone]    What is the best time to reach you: ANYTIME     Who are you requesting to speak with (clinical staff, provider,  specific staff member): NURSE     What was the call regarding: PATIENT'S SISTER IS CALLING STATING THAT PATIENT GAVE HIMSELF 3 FLEET ENEMA'S.  THEY WENT TO THE ER AT [facility] AND THEY GAVE HIM A MILK AND TAMIR ENEMA AND HE CAME HOME AROUND 6 THIS MORNING.  HE IS STILL HAVING SOME BOWEL MOVEMENTS  AND SISTER FIGURED HE SHOULD BE CLEANED OUT BY NOW.  NOT SURE WHAT NEEDS TO HAPPEN.     Do you require a callback: YES

## 2022-10-14 ENCOUNTER — OFFICE VISIT (OUTPATIENT)
Dept: FAMILY MEDICINE CLINIC | Age: 72
End: 2022-10-14

## 2022-10-14 ENCOUNTER — HOSPITAL ENCOUNTER (OUTPATIENT)
Dept: GENERAL RADIOLOGY | Facility: HOSPITAL | Age: 72
Discharge: HOME | End: 2022-10-14
Admitting: NURSE PRACTITIONER

## 2022-10-14 VITALS
DIASTOLIC BLOOD PRESSURE: 69 MMHG | SYSTOLIC BLOOD PRESSURE: 113 MMHG | OXYGEN SATURATION: 96 % | HEART RATE: 103 BPM | BODY MASS INDEX: 20.72 KG/M2 | TEMPERATURE: 98 F | WEIGHT: 132 LBS | HEIGHT: 67 IN

## 2022-10-14 DIAGNOSIS — F79 INTELLECTUAL DISABILITY: ICD-10-CM

## 2022-10-14 DIAGNOSIS — K59.00 CONSTIPATION, UNSPECIFIED CONSTIPATION TYPE: ICD-10-CM

## 2022-10-14 DIAGNOSIS — C15.9 ADENOCARCINOMA OF ESOPHAGUS: Primary | ICD-10-CM

## 2022-10-14 DIAGNOSIS — R39.16 BENIGN PROSTATIC HYPERPLASIA (BPH) WITH STRAINING ON URINATION: ICD-10-CM

## 2022-10-14 DIAGNOSIS — N40.1 BENIGN PROSTATIC HYPERPLASIA (BPH) WITH STRAINING ON URINATION: ICD-10-CM

## 2022-10-14 DIAGNOSIS — D12.6 ADENOMATOUS POLYP OF COLON, UNSPECIFIED PART OF COLON: ICD-10-CM

## 2022-10-14 DIAGNOSIS — R13.10 DYSPHAGIA, UNSPECIFIED TYPE: ICD-10-CM

## 2022-10-14 DIAGNOSIS — G25.0 ESSENTIAL TREMOR: ICD-10-CM

## 2022-10-14 DIAGNOSIS — R63.4 WEIGHT LOSS, NON-INTENTIONAL: ICD-10-CM

## 2022-10-14 DIAGNOSIS — F17.218 CIGARETTE NICOTINE DEPENDENCE WITH OTHER NICOTINE-INDUCED DISORDER: ICD-10-CM

## 2022-10-14 PROCEDURE — G0008 ADMIN INFLUENZA VIRUS VAC: HCPCS | Performed by: NURSE PRACTITIONER

## 2022-10-14 PROCEDURE — 99215 OFFICE O/P EST HI 40 MIN: CPT | Performed by: NURSE PRACTITIONER

## 2022-10-14 PROCEDURE — 74018 RADEX ABDOMEN 1 VIEW: CPT

## 2022-10-14 PROCEDURE — 90662 IIV NO PRSV INCREASED AG IM: CPT | Performed by: NURSE PRACTITIONER

## 2022-10-14 RX ORDER — LORAZEPAM 1 MG/1
TABLET ORAL
COMMUNITY
Start: 2022-09-16 | End: 2022-10-14

## 2022-10-14 RX ORDER — HYDROCODONE BITARTRATE AND ACETAMINOPHEN 5; 325 MG/1; MG/1
1 TABLET ORAL AS NEEDED
COMMUNITY
Start: 2022-09-23

## 2022-10-14 NOTE — PROGRESS NOTES
Assessment and Plan     I am referring him for care coordinator for complex/multiple provider coordination of care. Bethany is quite overwhelmed with all that is going on with Emir.  I discussed that she needs to reach out to her siblings for assistance. We discussed options for long term care - declines.    Home health will continue.  I would advise reeducate supplements through tube feeding and encourage to use gravity fed feedings/ encourage water intake   Diagnoses and all orders for this visit:    1. Adenocarcinoma of esophagus (HCC) (Primary)  Comments:  Continue to follow up with Dr. Wong  we will request records   Orders:  -     Ambulatory Referral to Social Care Services (Amb Case Mgmt)    2. Constipation, unspecified constipation type  Comments:  add mirilax- may be combination of supplement, recent tx with pain medication  encouraged increase water intake  Orders:  -     XR Abdomen KUB; Future  -     polyethylene glycol (MiraLax) 17 GM/SCOOP powder; Take 17 g by mouth Daily. May mix with any liquid  Dispense: 225 g; Refill: 1  -     Ambulatory Referral to Social Care Services (Amb Case Mgmt)    3. Benign prostatic hyperplasia (BPH) with straining on urination  Comments:  ill reach out to urology for recommendations for alternative medication for his BPH to be administered via PEG   Orders:  -     Ambulatory Referral to Social Care Services (Amb Case Mgmt)    4. Essential tremor  Comments:  Will discuss alternatives with MDs that may be administered liquid/ via PEG  Orders:  -     Ambulatory Referral to Social Care Services (Amb Case Mgmt)    5. Dysphagia, unspecified type  -     Ambulatory Referral to Social Care Services (Amb Case Mgmt)    6. Cigarette nicotine dependence with other nicotine-induced disorder    7. Intellectual disability    8. Weight loss, non-intentional  Comments:  continue supplements     9. Adenomatous polyp of colon, unspecified part of colon  Comments:  Given his problems with  constipation, I do recommend that he get his colonoscopy updated -2018 had recommended 3 year follow up and has not gotten this repeated    Other orders  -     Fluzone High-Dose 65+yrs (6331-5326)        I spent 50 minutes caring for Tristan on this date of service. This time includes time spent by me in the following activities:reviewing tests, obtaining and/or reviewing a separately obtained history, performing a medically appropriate examination and/or evaluation , counseling and educating the patient/family/caregiver, ordering medications, tests, or procedures, referring and communicating with other health care professionals , documenting information in the medical record and care coordination          Follow Up   Return in about 4 weeks (around 11/11/2022).    Chief Complaint  Tristan Bolivar presents to [facility] for Hospital Follow Up Visit (Patient went to ER on 10.12.22, pt was Dx with constipation and was given milk of molasses and told to follow up with PCP. /Pt having difficulty swallowing, mostly puree diet, patient POA requesting to have Flomax in a crushable or liquid form, also wanting to Wien off of Wellbutrin )    Subjective          History of Present Illness  2 bottles ensure plus - TID  2-3 syringes full of water     Swallow study-      Emir is here today with his sister Bethany and her  to follow up on recent ER visit at [facility]. Was treated for constipation -reports no imaging performed, no labs.  ER record unavailable at this time.      Bethany reports that since our last visit with Emir, He did follow up with Dr. Wong for the esophageal cancer.  He has had a port a cath placed , as well as a feeding tube by Dr. Guidry on 9/23.  Emir does continue to eat some food orally, but reports some difficulty and discomfort. Bethany has been instructed to supplement his oral intake with protein shakes/ensure 3 times per day  - 2 bottles each.  He has been experiencing  "constipation and has had to use several enemas over the past few days which Bethany reports that has been successful.  Emir does not take any stool softeners.      Emir is having some difficulty getting some of his medications swallowed and is requesting liquid if available.  The Tamsulosin  For his BPH and his Primidone for tremors.  He does follow up with urology but recently, provider is no longer available locally so has been somewhat of a challenge getting to appt due to distance.  The primidone is managed here and no neurology is monitoring as his tremors have been stable.      Bethany reports that they have stopped the Wellbutrin- this was started in hopes it would help him with reduction of his cigarette smoking- but Bethany reports that he has not and thinks this medication can be discontinued.      Bethany states that there has been some difficulty getting a swallow study completed for Emir  as recommended by speech therapy- but they are waiting on Dr. Wong to get this straightened out.     Regarding ensure supplements - Bethany reports that they have been giving him 2 bottles at around 11am, then again 3pm  And anther 2 bottles at 8pm. They are 'pushing' the ensure through the tube - She is feeling a bit overwhelmed by the whole process and reports that other than her , she is not getting much help from the family.  She reports that a facility is not an option -They do have home health nurse coming in that has educated on the supplements.              Review of Systems    Objective     Vitals:    10/14/22 1347   BP: 113/69   BP Location: Left arm   Patient Position: Sitting   Cuff Size: Adult   Pulse: 103   Temp: 98 °F (36.7 °C)   TempSrc: Oral   SpO2: 96%   Weight: 59.9 kg (132 lb)   Height: 170.2 cm (67.01\")     Body mass index is 20.67 kg/m².     Physical Exam  Vitals reviewed.   Constitutional:       Appearance: Normal appearance.   HENT:      Head: Normocephalic.   Eyes:      Pupils: Pupils are equal, " round, and reactive to light.   Cardiovascular:      Rate and Rhythm: Normal rate and regular rhythm.      Heart sounds: No murmur heard.  Pulmonary:      Effort: Pulmonary effort is normal.      Breath sounds: Normal breath sounds. Decreased air movement (diffuse) present.   Abdominal:       Musculoskeletal:         General: Normal range of motion.   Neurological:      Mental Status: He is alert.   Psychiatric:         Mood and Affect: Mood normal.         Behavior: Behavior normal.         Result Review                        No Known Allergies   Past Medical History:   Diagnosis Date   • Rock's esophagus    • Colon polyp    • History of kidney stones    • Mental deficiency     Responds very slowly to answer questions   • Renal stones    • Smokers' cough (HCC)    • Sore throat    • Tremor      Current Outpatient Medications   Medication Sig Dispense Refill   • albuterol sulfate  (90 Base) MCG/ACT inhaler Inhale 2 puffs Every 4 (Four) Hours As Needed for Wheezing. 8 g 0   • buPROPion SR (WELLBUTRIN SR) 150 MG 12 hr tablet TAKE 1 TABLET TWICE DAILY 180 tablet 0   • guaiFENesin (Mucinex) 600 MG 12 hr tablet Take 2 tablets by mouth 2 (Two) Times a Day. 14 tablet 0   • HYDROcodone-acetaminophen (NORCO) 5-325 MG per tablet Take 1 tablet by mouth As Needed.     • multivitamin with minerals tablet tablet Take 1 tablet by mouth Daily.     • primidone (MYSOLINE) 250 MG tablet TAKE 1 TABLET BY MOUTH UP TO THREE TIMES DAILY 90 tablet 3   • tamsulosin (FLOMAX) 0.4 MG capsule 24 hr capsule Take 1 capsule by mouth Daily for 360 days. 90 capsule 3   • polyethylene glycol (MiraLax) 17 GM/SCOOP powder Take 17 g by mouth Daily. May mix with any liquid 225 g 1     No current facility-administered medications for this visit.     Past Surgical History:   Procedure Laterality Date   • ENDOSCOPY N/A 9/2/2022    Procedure: ESOPHAGOGASTRODUODENOSCOPY WITH BIOPSIES;  Surgeon: Eduardo Bustamante MD;  Location: [facility] ENDOSCOPY;   Service: General;  Laterality: N/A;  ESOPHAGEAL MASS   • FEMUR SURGERY      earle placed   • KIDNEY STONE SURGERY        Health Maintenance Due   Topic Date Due   • TDAP/TD VACCINES (1 - Tdap) Never done   • ZOSTER VACCINE (1 of 2) Never done   • COLORECTAL CANCER SCREENING  03/08/2021   • COVID-19 Vaccine (4 - Booster for Pfizer series) 03/02/2022      Immunization History   Administered Date(s) Administered   • COVID-19 (PFIZER) PURPLE CAP 03/27/2021, 04/17/2021, 01/05/2022   • Fluzone High Dose =>65 Years ([facility] ONLY) 09/14/2017   • Fluzone High-Dose 65+yrs 10/14/2022   • Pneumococcal Conjugate 13-Valent (PCV13) 01/15/2018   • Pneumococcal Conjugate 20-Valent (PCV20) 08/17/2022

## 2022-10-17 RX ORDER — POLYETHYLENE GLYCOL 3350 17 G/17G
17 POWDER, FOR SOLUTION ORAL DAILY
Qty: 225 G | Refills: 1 | Status: SHIPPED | OUTPATIENT
Start: 2022-10-17

## 2022-10-18 ENCOUNTER — REFERRAL TRIAGE (OUTPATIENT)
Dept: CASE MANAGEMENT | Facility: OTHER | Age: 72
End: 2022-10-18

## 2022-10-19 ENCOUNTER — TELEPHONE (OUTPATIENT)
Dept: FAMILY MEDICINE CLINIC | Age: 72
End: 2022-10-19

## 2022-10-21 ENCOUNTER — PATIENT OUTREACH (OUTPATIENT)
Dept: CASE MANAGEMENT | Facility: OTHER | Age: 72
End: 2022-10-21

## 2022-10-21 NOTE — OUTREACH NOTE
Social Work Assessment  Questions/Answers    Flowsheet Row Most Recent Value   Referral Source physician   Reason for Consult community resources   Preferred Language English   People in Home sibling(s)   Current Living Arrangements home   Primary Care Provided by other (see comments)  [Sibling and brother in law provide care]   Provides Primary Care For no one, unable/limited ability to care for self   Family Caregiver if Needed sibling(s)   Quality of Family Relationships helpful, stressful, supportive   Source of Income disability   Application for Public Assistance pending public assistance pending number   Usual Activity Tolerance moderate   Current Activity Tolerance moderate   Medications assistive person   Meal Preparation assistive person   Housekeeping assistive person   Laundry assistive person   Shopping assistive person        SDOH updated and reviewed with the patient during this program:  Financial Resource Strain: Low Risk    • Difficulty of Paying Living Expenses: Not very hard      Food Insecurity: No Food Insecurity   • Worried About Running Out of Food in the Last Year: Never true   • Ran Out of Food in the Last Year: Never true      Transportation Needs: No Transportation Needs   • Lack of Transportation (Medical): No   • Lack of Transportation (Non-Medical): No      Housing Stability: Low Risk    • Unable to Pay for Housing in the Last Year: No   • Number of Places Lived in the Last Year: 1   • Unstable Housing in the Last Year: No      Continuing Care   [facility]   DEPARTMENT FOR MEDICAID SERVICES    [facility] [address] [facility] [address]    Phone: [phone]    Resource for: Financial Resource Strain   [address] [place] [address]    Phone: [phone]    Resource for: Financial Resource Strain, Food Insecurity     Care Coordination    MSW spoke with patient's sister to discuss resources needed. Patient's brother was  currently at oncology provider's office. MSW spoke with patient's sister about need for caregiver assistance. MSW provided education on Medicaid waiver programs. Patient's sister was interested in these services to assist with caregiver responsibilities. Patient's sister requested call earlier next week to discuss more in depth. MSW to eliane/willam PICKARD -   Ambulatory Case Management    10/21/2022, 12:40 EDT

## 2022-10-23 ENCOUNTER — TELEPHONE (OUTPATIENT)
Dept: FAMILY MEDICINE CLINIC | Age: 72
End: 2022-10-23

## 2022-10-25 DIAGNOSIS — C15.9 ADENOCARCINOMA OF ESOPHAGUS: Primary | ICD-10-CM

## 2022-10-25 DIAGNOSIS — F79 INTELLECTUAL DISABILITY: ICD-10-CM

## 2022-10-27 ENCOUNTER — REFERRAL TRIAGE (OUTPATIENT)
Dept: CASE MANAGEMENT | Facility: OTHER | Age: 72
End: 2022-10-27

## 2022-10-27 ENCOUNTER — PATIENT OUTREACH (OUTPATIENT)
Dept: CASE MANAGEMENT | Facility: OTHER | Age: 72
End: 2022-10-27

## 2022-11-01 ENCOUNTER — PATIENT OUTREACH (OUTPATIENT)
Dept: CASE MANAGEMENT | Facility: OTHER | Age: 72
End: 2022-11-01

## 2022-11-01 NOTE — OUTREACH NOTE
AMBULATORY CASE MANAGEMENT NOTE    Name and Relationship of Patient/Support Person: SHO BERNARDO - Emergency Contact    Spoke with sister Sho.  She is requesting a call from Social work, she could not get through to Lolly.      Discussed tube feedings and using gravity feeds  - Sho reports that she is pushing feeds.  Discussed that there was a recommendation from PCP to change.  Will also follow up with home health nurse regarding re-education.      I have reached out to urology regarding changing the flomax to a liquid alternative, there were no suggestions.  I have also reached out to neurology to alternative.    Sho reports that Emir is taking his medications orally without any difficulty this week.    I have spoken with Lois at Dr. Wong office regarding scheduling a MBS, they are taking care of this order.  Will continue to follow.    She was expressing difficulty getting the syringes (70cc or 60cc) for the tube feedings - again I will reach out to [facility] due to Sho reports that [facility] is reporting a back order on supplies.  The other gauze pads etc she has plenty of.    Constipation has resolved and having 3 BM day -    KRISTOPHER R  Ambulatory Case Management    11/1/2022, 11:27 EDT     Spoke with [facility] and Amaya at [facility].  Amaya is going to look into what payer will pay for syringes and call back.

## 2022-11-03 ENCOUNTER — PATIENT OUTREACH (OUTPATIENT)
Dept: CASE MANAGEMENT | Facility: OTHER | Age: 72
End: 2022-11-03

## 2022-11-03 NOTE — OUTREACH NOTE
Care Coordination    MSW spoke with patient's sister to provide resources and follow up with family. Patient's sister apologized for missing phone calls as they are having phone trouble at patient's home and she states that when they get home in the evening it's past business hours. Patient's sister provided with information through [facility]. Patient's sister states that they are doing 'okay' right now with caregiving. MSW provided incase needed in the future. Patient's sister given resources through [facility] organization to assist with any resources, counseling, or financial assistance needed. Patient's sister appreciative of resources and to call MSW if any further needs arise.    JOSE PICKARD -   Ambulatory Case Management    11/3/2022, 12:38 EDT

## 2022-11-09 ENCOUNTER — PATIENT OUTREACH (OUTPATIENT)
Dept: CASE MANAGEMENT | Facility: OTHER | Age: 72
End: 2022-11-09

## 2022-11-09 NOTE — OUTREACH NOTE
AMBULATORY CASE MANAGEMENT NOTE    Name and Relationship of Patient/Support Person: Bethany Blevins - Emergency Contact    Called and spoke with Bethany- Emir is having difficulty swallowing food again, and just doesn't want to eat.  He is taking his pills ok.  [facility] had [facility] with the equipment and did not receive radiation.  He has been given zofran 8 hour to take before each treatment and as needed.    Bethany reports that they were able to order 50cc syringes online for about .50 cents each.    The swallow study is back - interpretation from speech therapy:Recommend moist-mechanical soft foods, thins, in small bolus sizes, small bites/sips, slowed pace, pills crushed or via PEG.        Pulmonology - cancelled, can reschedule later if needed    Urology- reached out to Julito to see if he needs to be rescheduled or just have psa in 3 mos.     Spoke with speech therapist Zakia Hewitt, she is going over results and recommendations today with family.    KRISTOPHER PICKARD  Ambulatory Case Management    11/9/2022, 12:14 EST

## 2022-12-05 ENCOUNTER — PATIENT OUTREACH (OUTPATIENT)
Dept: CASE MANAGEMENT | Facility: OTHER | Age: 72
End: 2022-12-05

## 2022-12-05 DIAGNOSIS — R97.20 ELEVATED PROSTATE SPECIFIC ANTIGEN (PSA): Primary | ICD-10-CM

## 2022-12-05 DIAGNOSIS — N40.1 BENIGN PROSTATIC HYPERPLASIA WITH LOWER URINARY TRACT SYMPTOMS, SYMPTOM DETAILS UNSPECIFIED: ICD-10-CM

## 2022-12-05 NOTE — OUTREACH NOTE
AMBULATORY CASE MANAGEMENT NOTE    Name and Relationship of Patient/Support Person: Bethany Blevins - Emergency Contact    Spoke with sister Bethany, she reports that Emir has completed his radiation.  She notes that his weight is down but stable.  Still supplementing with 6 bottles of nutritional shakes and water. Still with nausea.      He is not taking any food by mouth except a very little, but is able to take meds PO.      She desires to follow up on referrals to pulmonology (just need to call to reschedule) , urology ( did not want to see Dr. Montejo but would like to see Dr. Borges, and ENT (desires to see Matias Harvey for chronic runny nose).  Place referrals if agreeable.     Education Documentation  No documentation found.        KRISTOPHER PICKARD  Ambulatory Case Management    12/5/2022, 16:33 EST

## 2022-12-13 ENCOUNTER — TELEPHONE (OUTPATIENT)
Dept: UROLOGY | Facility: CLINIC | Age: 72
End: 2022-12-13

## 2022-12-13 NOTE — TELEPHONE ENCOUNTER
LMOM for patient's sister, to call the office back and speak with Julito or Jules. Informed her that Dr. Montejo was had ordered a PSA in 3 months. I can fax that order to [facility] or they can have it done at a [facility] lab.

## 2022-12-19 ENCOUNTER — PATIENT OUTREACH (OUTPATIENT)
Dept: CASE MANAGEMENT | Facility: OTHER | Age: 72
End: 2022-12-19

## 2022-12-19 NOTE — OUTREACH NOTE
AMBULATORY CASE MANAGEMENT NOTE    Name and Relationship of Patient/Support Person: Bethany Blevins - Emergency Contact    Following up with Bethany regarding the referrals.  Will reach out to pulmonary regarding getting him rescheduled.  Also will wait till he sees Dr. Wong to see if he will refer to ENT.  Will follow.  Bethany states that he will repeat the PSA in January when he has his lab draw with Dr. Wong.  Will follow.     Education Documentation  No documentation found.        KRISTOPHER PICKARD  Ambulatory Case Management    12/19/2022, 13:46 EST

## 2023-01-09 ENCOUNTER — TELEPHONE (OUTPATIENT)
Dept: FAMILY MEDICINE CLINIC | Age: 73
End: 2023-01-09

## 2023-01-09 NOTE — TELEPHONE ENCOUNTER
Caller: Bethany Blevins    Relationship: Emergency Contact    Best call back number: [phone]    What medication are you requesting: CHANTIX    What are your current symptoms: PATIENT'S SISTER CALLED AND STATED THAT HE IS TRYING TO QUIT SMOKING.    Have you had these symptoms before:    [x] Yes  [] No    Have you been treated for these symptoms before:   [x] Yes  [] No    If a prescription is needed, what is your preferred pharmacy and phone number: [address] [facility] [phone] [facility] [phone]      Additional notes:PATIENT'S SISTER STATED THAT THE PATIENT HAS TRIED TO QUIT IN THE PAST, BUT HAS NOT TRIED CHANTIX BEFORE. PLEASE CALL PATIENT'S SISTER WHEN THIS IS SENT TO THE PHARMACY.

## 2023-01-10 ENCOUNTER — PATIENT OUTREACH (OUTPATIENT)
Dept: CASE MANAGEMENT | Facility: OTHER | Age: 73
End: 2023-01-10
Payer: MEDICARE

## 2023-01-10 NOTE — TELEPHONE ENCOUNTER
CARRIE MADE APPOINTMENT WITH ANOTHER PCP IN OFFICE, Sally Johnston APRN, ON 01/11/2022, TO BE SEEN.

## 2023-01-10 NOTE — OUTREACH NOTE
AMBULATORY CASE MANAGEMENT NOTE    Name and Relationship of Patient/Support Person: Felicity,Bethany L - Emergency Contact    Bethany had called in requesting a prescription for chantix for smoking sensation.  She reports that he is down from 2 packs a day down to 10 cigarettes a day.  She read that Chantix would make the patient stop and she wanted to give it to him to make him stop.  When she told Emir about it, he declined the medication and the visit.  He does not want to stop smoking.   He was scheduled today for PET scan , but he ate and could not have the testing done, rescheduled.  Reviewed all upcoming appointments.  Outreach created for follow up.     Education Documentation  No documentation found.        KRISTOPHER PICKARD  Ambulatory Case Management    1/10/2023, 15:46 EST

## 2023-01-10 NOTE — TELEPHONE ENCOUNTER
Appointment has been cancelled.  Patient was not agreeable to stop smoking.  Sister thought she could try this without him knowing and would make him quit.

## 2023-01-23 ENCOUNTER — LAB (OUTPATIENT)
Dept: LAB | Facility: HOSPITAL | Age: 73
End: 2023-01-23
Payer: MEDICARE

## 2023-01-23 ENCOUNTER — OFFICE VISIT (OUTPATIENT)
Dept: UROLOGY | Facility: CLINIC | Age: 73
End: 2023-01-23

## 2023-01-23 VITALS
WEIGHT: 132 LBS | TEMPERATURE: 97.5 F | DIASTOLIC BLOOD PRESSURE: 56 MMHG | BODY MASS INDEX: 22.53 KG/M2 | HEART RATE: 94 BPM | SYSTOLIC BLOOD PRESSURE: 109 MMHG | HEIGHT: 64 IN

## 2023-01-23 DIAGNOSIS — N40.1 BPH WITH OBSTRUCTION/LOWER URINARY TRACT SYMPTOMS: ICD-10-CM

## 2023-01-23 DIAGNOSIS — R97.20 ELEVATED PROSTATE SPECIFIC ANTIGEN (PSA): ICD-10-CM

## 2023-01-23 DIAGNOSIS — R97.20 ELEVATED PROSTATE SPECIFIC ANTIGEN (PSA): Primary | ICD-10-CM

## 2023-01-23 DIAGNOSIS — N13.8 BPH WITH OBSTRUCTION/LOWER URINARY TRACT SYMPTOMS: ICD-10-CM

## 2023-01-23 DIAGNOSIS — N40.3 PROSTATIC NODULE WITH OBSTRUCTION: ICD-10-CM

## 2023-01-23 DIAGNOSIS — N13.8 PROSTATIC NODULE WITH OBSTRUCTION: ICD-10-CM

## 2023-01-23 LAB
BILIRUB UR QL STRIP: NEGATIVE
CLARITY UR: ABNORMAL
COLOR UR: YELLOW
GLUCOSE UR STRIP-MCNC: NEGATIVE MG/DL
HGB UR QL STRIP.AUTO: NEGATIVE
KETONES UR QL STRIP: NEGATIVE
LEUKOCYTE ESTERASE UR QL STRIP.AUTO: NEGATIVE
NITRITE UR QL STRIP: NEGATIVE
PH UR STRIP.AUTO: 7 [PH] (ref 5–8)
PROT UR QL STRIP: ABNORMAL
SP GR UR STRIP: 1.02 (ref 1–1.03)
UROBILINOGEN UR QL STRIP: ABNORMAL

## 2023-01-23 PROCEDURE — 99214 OFFICE O/P EST MOD 30 MIN: CPT | Performed by: UROLOGY

## 2023-01-23 PROCEDURE — 81003 URINALYSIS AUTO W/O SCOPE: CPT

## 2023-01-23 RX ORDER — OXYCODONE HYDROCHLORIDE AND ACETAMINOPHEN 5; 325 MG/1; MG/1
1 TABLET ORAL
COMMUNITY

## 2023-01-23 RX ORDER — GRANISETRON 3.1 MG/24H
PATCH TRANSDERMAL
COMMUNITY
Start: 2022-12-01 | End: 2023-02-15

## 2023-01-23 RX ORDER — LORATADINE 10 MG/1
10 TABLET ORAL DAILY
COMMUNITY
End: 2023-01-23 | Stop reason: SDUPTHER

## 2023-01-23 RX ORDER — DEXAMETHASONE 4 MG/1
4 TABLET ORAL
COMMUNITY

## 2023-01-23 NOTE — PROGRESS NOTES
"Chief Complaint  Elevated PSA and Benign Prostatic Hypertrophy    Subjective  No acute distress        Tristan Bolivar presents to [facility]  History of Present Illness    Patient is mentally handicapped and has been having difficulty with urination.  Patient is on tamsulosin 0.4 mg [facility].  He was found to have elevation of PSA and underwent MRI of his prostate on September 13, 2022 which did not show any PI-RADS lesion.    Patient was diagnosed with cancer of esophagus in September 2022 for which she had radiation and chemotherapy.  Patient has a gastric tube for feeding at this time.  Patient has lost a lot of weight.  There is no history of prostate cancer in the family.    hIs mental handicap is since childhood and seems like like he had malnutrition from lactose allergy.  Patient went through high school uptil seventh grade.  Patient is a heavy smoker.    Objective No acute distress  Vital Signs:   /56   Pulse 94   Temp 97.5 °F (36.4 °C)   Ht 162.6 cm (64\")   Wt 59.9 kg (132 lb)   BMI 22.66 kg/m²     No Known Allergies   Past medical history:  has a past medical history of Rock's esophagus, Colon polyp, History of kidney stones, Mental deficiency, Renal stones, Smokers' cough (HCC), Sore throat, and Tremor.   Past surgical history:  has a past surgical history that includes Femur Surgery; Kidney stone surgery; and Esophagogastroduodenoscopy (N/A, 9/2/2022).  Personal history: family history includes Alzheimer's disease in an other family member; Cancer in his brother and father; Diabetes type II in an other family member; Heart attack in an other family member.  Social history:  reports that he has been smoking cigarettes. He has a 100.00 pack-year smoking history. He has never used smokeless tobacco. He reports that he does not currently use alcohol. He reports that he does not use drugs.    Review of Systems    Please see past medical and surgical history.  Patient has " cancer of esophagus according to the sister    Physical Exam  Constitutional:       General: He is not in acute distress.     Appearance: Normal appearance. He is normal weight. He is not ill-appearing or toxic-appearing.      Comments: Patient has gastric feeding tube   HENT:      Head: Normocephalic and atraumatic.      Ears:      Comments: No loss of hearing  Cardiovascular:      Rate and Rhythm: Normal rate and regular rhythm.      Pulses: Normal pulses.      Heart sounds: Normal heart sounds. No murmur heard.  Pulmonary:      Effort: Pulmonary effort is normal. No respiratory distress.      Breath sounds: Normal breath sounds. No rhonchi or rales.   Abdominal:      Palpations: Abdomen is soft. There is no mass.      Tenderness: There is no abdominal tenderness. There is no right CVA tenderness or left CVA tenderness.      Comments: Gastric feeding tube is present   Genitourinary:     Comments: Uncircumcised normal penis.    Right and left scrotum is normal.    Right and left testicles and epididymis is normal.    MANOLO.  Prostate gland is almost 50 g.  He has nodule at the right apex.  The nodule is not stony hard  Musculoskeletal:         General: No swelling. Normal range of motion.      Cervical back: Normal range of motion and neck supple. No rigidity or tenderness.      Right lower leg: No edema.      Left lower leg: No edema.   Lymphadenopathy:      Cervical: No cervical adenopathy.   Skin:     General: Skin is warm.      Coloration: Skin is not jaundiced.   Neurological:      General: No focal deficit present.      Mental Status: He is alert and oriented to person, place, and time.      Motor: No weakness.      Gait: Gait normal.   Psychiatric:         Mood and Affect: Mood normal.         Behavior: Behavior normal.         Thought Content: Thought content normal.         Judgment: Judgment normal.        Result Review :                 Assessment and Plan    Diagnoses and all orders for this visit:    1.  Elevated prostate specific antigen (PSA) (Primary)  -     POC Urinalysis Dipstick, Automated; Future    2. BPH with obstruction/lower urinary tract symptoms  -     POC Urinalysis Dipstick, Automated; Future    3. Prostatic nodule with obstruction    Patient is mentally challenged and on MRI of his prostate he seems to have a bladder outlet obstruction and possible stones in the bladder.  I have offered them ultrasound of prostate and biopsy if the family wants a diagnosis.  Patient has cancer of esophagus and I do not know about his prognosis.  I did not think that prostate cancer even if he has it, will affect his survival.  Patient could not urinate today     Brief Urine Lab Results  (Last result in the past 365 days)      Color   Clarity   Blood   Leuk Est   Nitrite   Protein   CREAT   Urine HCG        08/31/22 1514 Yellow   Clear   Negative   Small (1+)   Negative   Negative                  Follow Up   No follow-ups on file.  Patient was given instructions and counseling regarding his condition or for health maintenance advice. Please see specific information pulled into the AVS if appropriate.     Aniyah Borges MD

## 2023-01-26 ENCOUNTER — TELEPHONE (OUTPATIENT)
Dept: UROLOGY | Facility: CLINIC | Age: 73
End: 2023-01-26

## 2023-01-26 NOTE — TELEPHONE ENCOUNTER
Caller: RYNE ESPINOSA        Best call back number:[phone]    Patient is needing: ONCOLOGIST RECOMMENDS YOU ORDER THE BIOPSY. FURTHER IF LABS ARE NEEDED FOR PT SUBMIT REQUEST FROM MENDO[facility] FLYNN

## 2023-01-27 ENCOUNTER — PATIENT OUTREACH (OUTPATIENT)
Dept: CASE MANAGEMENT | Facility: OTHER | Age: 73
End: 2023-01-27
Payer: MEDICARE

## 2023-01-27 NOTE — OUTREACH NOTE
AMBULATORY CASE MANAGEMENT NOTE    Name and Relationship of Patient/Support Person: Bethany Blevins - Emergency Contact    Attempted to call Bethany for follow up.  Left information how to reach ACM.     KRISTOPHER PICKARD  Ambulatory Case Management    1/27/2023, 14:24 EST

## 2023-01-30 ENCOUNTER — TELEPHONE (OUTPATIENT)
Dept: FAMILY MEDICINE CLINIC | Age: 73
End: 2023-01-30

## 2023-01-30 NOTE — TELEPHONE ENCOUNTER
Caller: Bethany Blevins    Relationship: Emergency Contact    Best call back number: [phone]    What is the best time to reach you: ANY    Who are you requesting to speak with (clinical staff, provider,  specific staff member): CLINICAL STAFF    What was the call regarding: PATIENTS SITER CALLED STATING THAT [facility] NEEDS DOCUMENTATION REQUESTING HIS LABS/IMAGING RESULTS    Do you require a callback: YES

## 2023-01-30 NOTE — TELEPHONE ENCOUNTER
Called and spoke with Bethany.  She just wanted to be sure that her PCP was getting all of the reports from et (Marvin/PET ect).  Reassurance that we can see the records.  She also states that Emir is complaining of nausea and some vomiting.  Encouraged her to follow the recommendations given by Dr. Wong office since they see this type of cancer related nausea more than we do.  Bethany reports that he is down to 10 cigarettes a day and he gets them at 6pm and smokes them one after another.  Bethany reports that he is not taking much by mouth anymore.  She thinks his nerves are bothering him and would like to know if ok to restart the Wellbutrin?    Emir also is being referred to Dr. Erick Wong in [place] and urology for possible TURP.

## 2023-02-06 ENCOUNTER — PATIENT OUTREACH (OUTPATIENT)
Dept: CASE MANAGEMENT | Facility: OTHER | Age: 73
End: 2023-02-06
Payer: MEDICARE

## 2023-02-06 NOTE — OUTREACH NOTE
AMBULATORY CASE MANAGEMENT NOTE    Name and Relationship of Patient/Support Person: Bethany Blevins - Emergency Contact    Received a discharge summary stating patient was discharged on 2/3/23, however he was not and is still currently inpatient.  Possibly going to SNU.  Family has been with him at bedside all day and evening.  Monitoring status in Epic.  He is scheduled for EGD and dilatation on 2/13/23 with Dr. Burrell.  She is going to move Dr. Wong consult until he is better.  Will continue to monitor.     Education Documentation  No documentation found.        KRISTOPHER PICKARD  Ambulatory Case Management    2/6/2023, 12:32 EST

## 2023-02-09 ENCOUNTER — READMISSION MANAGEMENT (OUTPATIENT)
Dept: CALL CENTER | Facility: HOSPITAL | Age: 73
End: 2023-02-09
Payer: MEDICARE

## 2023-02-09 NOTE — OUTREACH NOTE
Prep Survey    Flowsheet Row Responses   [facility] facility patient discharged from? Non-BH   Is LACE score < 7 ? Non-BH Discharge   Eligibility [facility]   Hospital [facility]   Date of Discharge 02/09/23   Discharge Disposition Home or Self Care   Discharge diagnosis N/V   Does the patient have one of the following disease processes/diagnoses(primary or secondary)? Other   Prep survey completed? Yes          Yana MARTINEZ - Registered Nurse

## 2023-02-10 ENCOUNTER — TELEPHONE (OUTPATIENT)
Dept: FAMILY MEDICINE CLINIC | Age: 73
End: 2023-02-10
Payer: MEDICARE

## 2023-02-10 ENCOUNTER — TRANSITIONAL CARE MANAGEMENT TELEPHONE ENCOUNTER (OUTPATIENT)
Dept: CALL CENTER | Facility: HOSPITAL | Age: 73
End: 2023-02-10
Payer: MEDICARE

## 2023-02-10 NOTE — OUTREACH NOTE
Call Center TCM Note    Flowsheet Row Responses   [facility] patient discharged from? Non-   Does the patient have one of the following disease processes/diagnoses(primary or secondary)? Other   TCM attempt successful? No   Unsuccessful attempts Attempt 1   Call Status Left message          Trevor Kevin RN    2/10/2023, 11:41 EST

## 2023-02-10 NOTE — OUTREACH NOTE
"Call Center TCM Note    Flowsheet Row Responses   [facility] patient discharged from? Non-BH   Does the patient have one of the following disease processes/diagnoses(primary or secondary)? Other   TCM attempt successful? Yes   Call start time 1224   Call end time 1234   General alerts for this patient Sister reports patient is \"mentally challenged.\"   Discharge diagnosis N/V and Covid   Is patient permission given to speak with other caregiver? Yes   Person spoke with today (if not patient) and relationship sister Bethany Morrison reviewed with patient/caregiver? Yes   Is the patient having any side effects they believe may be caused by any medication additions or changes? No   Does the patient have all medications ordered at discharge? Yes   Is the patient taking all medications as directed (includes completed medication regime)? Yes   Comments Sees Urology on Tues 2/14/2023 and PCP Ashley Robertson on 2/14/2023. and also sees GI on Monday 2/13/2023   Does the patient have an appointment with their PCP within 7 days of discharge? Yes   Psychosocial issues? No   Did the patient receive a copy of their discharge instructions? Yes   Nursing interventions Reviewed instructions with patient   What is the patient's perception of their health status since discharge? Improving   Is the patient/caregiver able to teach back signs and symptoms related to disease process for when to call PCP? Yes   Is the patient/caregiver able to teach back signs and symptoms related to disease process for when to call 911? Yes   Is the patient/caregiver able to teach back the hierarchy of who to call/visit for symptoms/problems? PCP, Specialist, Home health nurse, Urgent Care, ED, 911 Yes   TCM call completed? Yes   Wrap up additional comments Patient has a GTube, (all feedings and meds are by Gtube) and a urinary cath)and his sister is staying with him for now to help with his care.    Call end time 1234   Would this patient benefit from a " Referral to [facility]? No   Is the patient interested in additional calls from an ambulatory ?  NOTE:  applies to high risk patients requiring additional follow-up. No          Trevor Kevin RN    2/10/2023, 12:34 EST

## 2023-02-10 NOTE — TELEPHONE ENCOUNTER
Caller: Bethany Blevins    Relationship: Emergency Contact    Best call back number: [phone]    What is the best time to reach you: ANYTIME     Who are you requesting to speak with (clinical staff, provider,  specific staff member): CLINICAL       What was the call regarding: PATIENT WAS RECENTLY DC FROM [facility] FOLLOWING A MEDICATION THAT WAS PRESCRIBED FOR SPRIVA, PATIENT SISTER STATED SHE CONTACTED THE HOSPITAL PROVIDER, AND WAS ADVISED TO REACH TO PCP , TO BE PRESCRIBED BY PCP PROVIDER AND RESEND OF MEDICATION TO PATIENT PREFERRED PHARMACY.     Do you require a callback: YES

## 2023-02-13 ENCOUNTER — PATIENT OUTREACH (OUTPATIENT)
Dept: CASE MANAGEMENT | Facility: OTHER | Age: 73
End: 2023-02-13
Payer: MEDICARE

## 2023-02-13 NOTE — OUTREACH NOTE
AMBULATORY CASE MANAGEMENT NOTE    Name and Relationship of Patient/Support Person: [place],Bethany L - Emergency Contact    Bethany had called office on Friday requesting for a inhaler to be sent into pharmacy.  She was able to find one at the house that was new and not , using that one.  Also she had to cancel his EGD today, but she did not reschedule.  Instructed to call Dr. Burrell office to try to get him reschedule for next week when he is clear with Covid.      Education Documentation  No documentation found.        Nohemi PICKARD  Ambulatory Case Management    2023, 08:49 EST

## 2023-02-13 NOTE — TELEPHONE ENCOUNTER
Contacted Bethany, she found an inhaler at home that was new/not used and not .  She no longer needs us to send one in.

## 2023-02-14 ENCOUNTER — OFFICE VISIT (OUTPATIENT)
Dept: UROLOGY | Facility: CLINIC | Age: 73
End: 2023-02-14
Payer: MEDICARE

## 2023-02-14 VITALS
SYSTOLIC BLOOD PRESSURE: 123 MMHG | HEART RATE: 116 BPM | BODY MASS INDEX: 22.53 KG/M2 | WEIGHT: 132 LBS | DIASTOLIC BLOOD PRESSURE: 60 MMHG | HEIGHT: 64 IN | TEMPERATURE: 98.4 F

## 2023-02-14 DIAGNOSIS — N13.8 BPH WITH OBSTRUCTION/LOWER URINARY TRACT SYMPTOMS: Primary | ICD-10-CM

## 2023-02-14 DIAGNOSIS — R97.20 ELEVATED PROSTATE SPECIFIC ANTIGEN (PSA): Primary | ICD-10-CM

## 2023-02-14 DIAGNOSIS — R33.9 URINARY RETENTION: ICD-10-CM

## 2023-02-14 DIAGNOSIS — Z97.8 FOLEY CATHETER IN PLACE: ICD-10-CM

## 2023-02-14 DIAGNOSIS — N40.1 BPH WITH OBSTRUCTION/LOWER URINARY TRACT SYMPTOMS: Primary | ICD-10-CM

## 2023-02-14 DIAGNOSIS — R97.20 ELEVATED PROSTATE SPECIFIC ANTIGEN (PSA): ICD-10-CM

## 2023-02-14 DIAGNOSIS — B37.41 YEAST CYSTITIS: ICD-10-CM

## 2023-02-14 PROBLEM — Z86.59 PERSONAL HISTORY OF OTHER MENTAL AND BEHAVIORAL DISORDERS: Status: ACTIVE | Noted: 2023-01-30

## 2023-02-14 PROBLEM — Z92.21 PERSONAL HISTORY OF ANTINEOPLASTIC CHEMOTHERAPY: Status: ACTIVE | Noted: 2023-01-30

## 2023-02-14 PROBLEM — R63.4 WEIGHT LOSS: Status: ACTIVE | Noted: 2022-11-21

## 2023-02-14 PROBLEM — Z92.3 HISTORY OF RADIATION THERAPY: Status: ACTIVE | Noted: 2023-01-30

## 2023-02-14 PROBLEM — E55.9 VITAMIN D DEFICIENCY: Status: ACTIVE | Noted: 2023-01-26

## 2023-02-14 PROBLEM — Z86.73 PERSONAL HISTORY OF TRANSIENT ISCHEMIC ATTACK (TIA), AND CEREBRAL INFARCTION WITHOUT RESIDUAL DEFICITS: Status: ACTIVE | Noted: 2023-01-30

## 2023-02-14 PROBLEM — N40.0 BENIGN PROSTATIC HYPERPLASIA: Status: ACTIVE | Noted: 2023-01-26

## 2023-02-14 PROBLEM — U07.1 COVID-19: Status: ACTIVE | Noted: 2023-02-09

## 2023-02-14 PROBLEM — C15.4: Status: ACTIVE | Noted: 2022-09-22

## 2023-02-14 PROBLEM — E86.0 LUETSCHER'S SYNDROME: Status: ACTIVE | Noted: 2022-11-21

## 2023-02-14 PROBLEM — J44.9 CHRONIC OBSTRUCTIVE PULMONARY DISEASE (HCC): Status: ACTIVE | Noted: 2023-01-30

## 2023-02-14 PROBLEM — D61.818 PANCYTOPENIA: Status: ACTIVE | Noted: 2023-02-09

## 2023-02-14 LAB
BACTERIA UR QL AUTO: ABNORMAL /HPF
BILIRUB BLD-MCNC: NEGATIVE MG/DL
CLARITY, POC: CLEAR
COLOR UR: YELLOW
EPI CELLS #/AREA URNS HPF: ABNORMAL /[HPF]
EXPIRATION DATE: ABNORMAL
GLUCOSE UR STRIP-MCNC: NEGATIVE MG/DL
KETONES UR QL: NEGATIVE
LEUKOCYTE EST, POC: ABNORMAL
Lab: ABNORMAL
NITRITE UR-MCNC: NEGATIVE MG/ML
PH UR: 7 [PH] (ref 5–8)
PROT UR STRIP-MCNC: ABNORMAL MG/DL
RBC # UR STRIP: ABNORMAL /HPF
RBC # UR STRIP: ABNORMAL /UL
RENAL EPITHELIAL, POC: 0
SP GR UR: 1.02 (ref 1–1.03)
UNIDENT CRYS URNS QL MICRO: ABNORMAL /HPF
UROBILINOGEN UR QL: ABNORMAL
WBC # UR STRIP: ABNORMAL /HPF

## 2023-02-14 PROCEDURE — 87086 URINE CULTURE/COLONY COUNT: CPT | Performed by: UROLOGY

## 2023-02-14 PROCEDURE — 81003 URINALYSIS AUTO W/O SCOPE: CPT | Performed by: UROLOGY

## 2023-02-14 PROCEDURE — 99214 OFFICE O/P EST MOD 30 MIN: CPT | Performed by: UROLOGY

## 2023-02-14 PROCEDURE — 51798 US URINE CAPACITY MEASURE: CPT | Performed by: UROLOGY

## 2023-02-14 PROCEDURE — 87081 CULTURE SCREEN ONLY: CPT | Performed by: UROLOGY

## 2023-02-14 RX ORDER — DOXAZOSIN MESYLATE 1 MG/1
1 TABLET ORAL DAILY
COMMUNITY
Start: 2023-02-03 | End: 2023-02-15 | Stop reason: SDUPTHER

## 2023-02-14 RX ORDER — FLUCONAZOLE 100 MG/1
100 TABLET ORAL DAILY
Qty: 3 TABLET | Refills: 0 | Status: SHIPPED | OUTPATIENT
Start: 2023-02-14 | End: 2023-02-17

## 2023-02-14 RX ORDER — PRIMIDONE 250 MG/1
250 TABLET ORAL
COMMUNITY
Start: 2023-02-03 | End: 2023-02-14

## 2023-02-14 RX ORDER — LEVOFLOXACIN 500 MG/1
1 TABLET, FILM COATED ORAL DAILY
COMMUNITY
Start: 2023-02-09 | End: 2023-02-15

## 2023-02-14 NOTE — PROGRESS NOTES
"Chief Complaint  Elevated PSA    BPH    Urinary retention    Subjective  Patient is mentally handicapped but cooperative        Tristan Bolivar presents to [facility]  History of Present Illness    72-year-old white male has elevated PSA and on rectal examination he has a huge prostate with nodularity of the right base.  MRI of prostate did not show any significant PI-RADS lesion.  Patient also has esophageal cancer and underwent radiation.  Is going to do a general surgeon to see if the lower esophagus can be removed.  The cancer still exist.    Patient and family request to have this catheter removed.  Patient was not taking Flomax while he was in the hospital before going into urinary retention.  Patient has a gastrostomy tube.  He cannot eat or drink anything because of esophageal stenosis.    Family request to have a prostatic biopsy and we have to take a swab on him today.    Objective No acute distress  Vital Signs:   /60   Pulse 116   Temp 98.4 °F (36.9 °C)   Ht 162.6 cm (64\")   Wt 59.9 kg (132 lb)   BMI 22.66 kg/m²     No Known Allergies   Past medical history:  has a past medical history of Rock's esophagus, Chronic obstructive pulmonary disease (HCC) (1/30/2023), Colon polyp, History of kidney stones, Mental deficiency, Renal stones, Smokers' cough (HCC), Sore throat, and Tremor.   Past surgical history:  has a past surgical history that includes Femur Surgery; Kidney stone surgery; and Esophagogastroduodenoscopy (N/A, 9/2/2022).  Personal history: family history includes Alzheimer's disease in an other family member; Cancer in his brother and father; Diabetes type II in an other family member; Heart attack in an other family member.  Social history:  reports that he has been smoking cigarettes. He has a 100.00 pack-year smoking history. He has never used smokeless tobacco. He reports that he does not currently use alcohol. He reports that he does not use " drugs.    Review of Systems     Physical Exam  Constitutional:       General: He is not in acute distress.     Appearance: Normal appearance. He is normal weight. He is not ill-appearing or toxic-appearing.      Comments: Mentally handicapped but he is verbal   HENT:      Head: Normocephalic and atraumatic.      Ears:      Comments: No hearing loss  Abdominal:      Palpations: There is no mass.      Tenderness: There is no abdominal tenderness. There is no right CVA tenderness or left CVA tenderness.      Comments: There is a left gastrostomy and left upper quadrant   Genitourinary:     Comments: Patient has a Romero catheter in place.  Uncircumcised penis.    Right and left scrotum is normal.    External hemorrhoid present.    Anal swab was taken  Skin:     General: Skin is warm.      Coloration: Skin is not jaundiced.   Neurological:      General: No focal deficit present.      Mental Status: He is alert and oriented to person, place, and time.      Motor: Weakness present.      Gait: Gait abnormal.      Comments: Patient is walking with a walker   Psychiatric:         Mood and Affect: Mood normal.         Behavior: Behavior normal.         Thought Content: Thought content normal.         Judgment: Judgment normal.        Result Review :                 Assessment and Plan    Diagnoses and all orders for this visit:    1. BPH with obstruction/lower urinary tract symptoms (Primary)  -     POC Urinalysis Dipstick, Automated  -     fluconazole (Diflucan) 100 MG tablet; Take 1 tablet by mouth Daily for 3 days.  Dispense: 3 tablet; Refill: 0  -     POC Urine Microscopic Only  -     Urine Culture - Urine, Urine, Clean Catch    2. Elevated prostate specific antigen (PSA)  -     fluconazole (Diflucan) 100 MG tablet; Take 1 tablet by mouth Daily for 3 days.  Dispense: 3 tablet; Refill: 0  -     POC Urine Microscopic Only  -     Urine Culture - Urine, Urine, Clean Catch    3. Yeast cystitis  -     fluconazole (Diflucan) 100  MG tablet; Take 1 tablet by mouth Daily for 3 days.  Dispense: 3 tablet; Refill: 0  -     POC Urine Microscopic Only  -     Urine Culture - Urine, Urine, Clean Catch    4. Urinary retention    5. Romero catheter in place    Other orders  -     Bladder Scan    Be filled the bladder with water and catheter was removed.  Patient was allowed to urinate in the toilet and bladder scan was performed.    Bladder scan.  3.5 MHz transducer was applied in the suprapubic area after patient urinated and he had only 20 mm left.  We are going to take the catheter out and will try to to biopsy of his prostate and ultrasound next week.  Ultrasound was done by Mr. Andrzej medina.    Diflucan 100 mg p.o. for 3 days given.  We are going to biopsy on his prostate with ultrasound next week.     Brief Urine Lab Results  (Last result in the past 365 days)      Color   Clarity   Blood   Leuk Est   Nitrite   Protein   CREAT   Urine HCG        02/14/23 1349 Yellow   Clear   Large   Small (1+)   Negative   30 mg/dL                  Follow Up   Return in about 1 week (around 2/21/2023).  Patient was given instructions and counseling regarding his condition or for health maintenance advice. Please see specific information pulled into the AVS if appropriate.     Aniyah Borges MD

## 2023-02-15 ENCOUNTER — LAB (OUTPATIENT)
Dept: LAB | Facility: HOSPITAL | Age: 73
End: 2023-02-15
Payer: MEDICARE

## 2023-02-15 ENCOUNTER — OFFICE VISIT (OUTPATIENT)
Dept: FAMILY MEDICINE CLINIC | Age: 73
End: 2023-02-15
Payer: MEDICARE

## 2023-02-15 VITALS
HEIGHT: 64 IN | DIASTOLIC BLOOD PRESSURE: 73 MMHG | BODY MASS INDEX: 20.18 KG/M2 | WEIGHT: 118.2 LBS | HEART RATE: 110 BPM | TEMPERATURE: 98.6 F | SYSTOLIC BLOOD PRESSURE: 106 MMHG | OXYGEN SATURATION: 96 %

## 2023-02-15 DIAGNOSIS — Z93.1 GASTROSTOMY TUBE IN PLACE: ICD-10-CM

## 2023-02-15 DIAGNOSIS — J18.9 HOSPITAL-ACQUIRED PNEUMONIA: Primary | ICD-10-CM

## 2023-02-15 DIAGNOSIS — F79 INTELLECTUAL DISABILITY: ICD-10-CM

## 2023-02-15 DIAGNOSIS — D72.819 LEUKOPENIA, UNSPECIFIED TYPE: ICD-10-CM

## 2023-02-15 DIAGNOSIS — R97.20 ELEVATED PROSTATE SPECIFIC ANTIGEN (PSA): ICD-10-CM
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
## 2023-02-15 DIAGNOSIS — R05.9 COUGH: ICD-10-CM

## 2023-02-15 DIAGNOSIS — J44.9 CHRONIC OBSTRUCTIVE PULMONARY DISEASE, UNSPECIFIED COPD TYPE: ICD-10-CM

## 2023-02-15 DIAGNOSIS — N40.0 BENIGN PROSTATIC HYPERPLASIA, UNSPECIFIED WHETHER LOWER URINARY TRACT SYMPTOMS PRESENT: ICD-10-CM

## 2023-02-15 DIAGNOSIS — K22.2 ESOPHAGEAL STRICTURE: ICD-10-CM

## 2023-02-15 DIAGNOSIS — F17.211 CIGARETTE NICOTINE DEPENDENCE IN REMISSION: ICD-10-CM

## 2023-02-15 DIAGNOSIS — R63.4 WEIGHT LOSS: ICD-10-CM

## 2023-02-15 LAB
BASOPHILS # BLD AUTO: 0 10*3/MM3 (ref 0–0.2)
BASOPHILS NFR BLD AUTO: 0 % (ref 0–1.5)
DEPRECATED RDW RBC AUTO: 54.3 FL (ref 37–54)
EOSINOPHIL # BLD AUTO: 0 10*3/MM3 (ref 0–0.4)
EOSINOPHIL NFR BLD AUTO: 0 % (ref 0.3–6.2)
ERYTHROCYTE [DISTWIDTH] IN BLOOD BY AUTOMATED COUNT: 12.6 % (ref 12.3–15.4)
HCT VFR BLD AUTO: 42.5 % (ref 37.5–51)
HGB BLD-MCNC: 14.4 G/DL (ref 13–17.7)
IMM GRANULOCYTES # BLD AUTO: 0.02 10*3/MM3 (ref 0–0.05)
IMM GRANULOCYTES NFR BLD AUTO: 0.2 % (ref 0–0.5)
LYMPHOCYTES # BLD AUTO: 0.23 10*3/MM3 (ref 0.7–3.1)
LYMPHOCYTES NFR BLD AUTO: 2.7 % (ref 19.6–45.3)
MCH RBC QN AUTO: 38.6 PG (ref 26.6–33)
MCHC RBC AUTO-ENTMCNC: 33.9 G/DL (ref 31.5–35.7)
MCV RBC AUTO: 113.9 FL (ref 79–97)
MONOCYTES # BLD AUTO: 1.23 10*3/MM3 (ref 0.1–0.9)
MONOCYTES NFR BLD AUTO: 14.2 % (ref 5–12)
NEUTROPHILS NFR BLD AUTO: 7.18 10*3/MM3 (ref 1.7–7)
NEUTROPHILS NFR BLD AUTO: 82.9 % (ref 42.7–76)
PLATELET # BLD AUTO: 232 10*3/MM3 (ref 140–450)
PMV BLD AUTO: 9.7 FL (ref 6–12)
PSA SERPL-MCNC: 13.7 NG/ML (ref 0–4)
RBC # BLD AUTO: 3.73 10*6/MM3 (ref 4.14–5.8)
WBC NRBC COR # BLD: 8.66 10*3/MM3 (ref 3.4–10.8)

## 2023-02-15 PROCEDURE — 84153 ASSAY OF PSA TOTAL: CPT

## 2023-02-15 PROCEDURE — 80061 LIPID PANEL: CPT | Performed by: NURSE PRACTITIONER

## 2023-02-15 PROCEDURE — 1111F DSCHRG MED/CURRENT MED MERGE: CPT | Performed by: NURSE PRACTITIONER

## 2023-02-15 PROCEDURE — 80053 COMPREHEN METABOLIC PANEL: CPT | Performed by: NURSE PRACTITIONER

## 2023-02-15 PROCEDURE — 36415 COLL VENOUS BLD VENIPUNCTURE: CPT

## 2023-02-15 PROCEDURE — 85025 COMPLETE CBC W/AUTO DIFF WBC: CPT

## 2023-02-15 PROCEDURE — 99496 TRANSJ CARE MGMT HIGH F2F 7D: CPT | Performed by: NURSE PRACTITIONER

## 2023-02-15 RX ORDER — DOXAZOSIN MESYLATE 1 MG/1
1 TABLET ORAL DAILY
Qty: 90 TABLET | Refills: 0 | Status: SHIPPED | OUTPATIENT
Start: 2023-02-15 | End: 2023-03-02

## 2023-02-15 RX ORDER — ALBUTEROL SULFATE 90 UG/1
2 AEROSOL, METERED RESPIRATORY (INHALATION) EVERY 4 HOURS PRN
Qty: 8 G | Refills: 0 | Status: SHIPPED | OUTPATIENT
Start: 2023-02-15 | End: 2023-03-13

## 2023-02-15 NOTE — PROGRESS NOTES
Chief Complaint  Tristan Bolivar presents to [facility] for Hospital Follow Up Visit (Patient has appointment with surgeon on Monday to go forward with esophageal surgery. Nodule was recently found in prostate, seeing Dr. Pulido/Needing refill on albuterol, & cardura )      Subjective     History of Present Illness  Emir IS here today with sister Bethany to follow up after a recent hospitalization.  Tristan was admitted to [facility] on 1/30/2023 and was discharged on 2/9/2023 with a DIAGNOSIS  of  N/V, acute nonintractable HA, HTN, dental caries, pain due to dental caries, abdominal distension;  Esophageal stricture , urinary retention, BPH, Hypokalemia, macrocytic anemia, dehydration ,hospital acquired pneumonia / sepsis ;  Leukopenia /neutropenia  His TREATMENT included rehydration, EGD (narrow esophageal stricture in superior esophagus);  levaquin during the hospital stay. Bethany reports that the feeling was that they were feeding him too much at home since he was also supplementing with oral intake. They have been instructed otherwise     I reviewed :   -discharge summary yes   -hospital problems yes   -inpatient lab results (yes  -inpatient diagnostic studies yes  -consultation reports yes   with Tristan/Bethany and discussed the details of the hospital admission and they did not have any questions or concerns.      During his hospital stay, he  was treated by Dr. Chava Campa/ Fred[facility] Blanchard Cosby (Hospitalists) and consults during hospitalization included Dr. Burrell (General Surgery).        Additional discharge recommendations include:Changes were made to his tube feeding. He is currently instructed to be NPO and only feedings through his gtube. Bethany was instructed to feed 8 oz  4 timesper day with 4 oz of water per feeding.  She was also instructed on procedure to check for residual and reports that she feels confident in her ability to follow instructions. .      Follow up appts already scheduled  include:  Future Appointments  2/23/2023  3:00 PM    Aniyah Borges MD  [facility]        [facility]  3/13/2023  9:45 AM    Saroj Blackburn MD       [facility]  4/17/2023  3:45 PM    Ashley Robertson APRN      [facility] .    Appointments that need to be made include:call and resume home health    Tristan reports that his condition is remained unchanged since discharge although Bethany is concerned with his weight loss.    Within 48 business hours after discharge our office contacted him via telephone to coordinate his care and needs.    Current outpatient and discharge medications have been reconciled for the patient.  Reviewed by: RADHA Campbell      Bethany reports that he has been having some bleeding around tube feeding  - she is changing dressing BID;  I looked at this today and do not see any concerning findings  Suspect irritation.  Follow up if no improvement or if worsening symptoms     He also has a dental abscess - antibiotic placed in ER- Dental appt scheduled for next week    Last Levaquin was yesterday    Bethany also reports that Emir has not smoked since leaving the hospital.          Assessment and Plan       Diagnoses and all orders for this visit:    1. Hospital-acquired pneumonia (Primary)  Comments:  We will have him follow up with Pulmonary as scheduled.  He has several appt this week and I do not feel that he will be able to get in sooner due to conflict    2. Chronic obstructive pulmonary disease, unspecified COPD type (HCC)  Comments:  Follow up with Dr. Blackburn as scheduled     3. Leukopenia, unspecified type  Comments:  will repeat labs today, follow up based on results  Orders:  -     CBC & Differential; Future  -     Comprehensive metabolic panel; Future    4. Esophageal stricture  Comments:  Follow up with Dr. Burrell as scheduled     5. Cigarette nicotine dependence in remission    6. Weight loss  Comments:  close  "monitoring by home health. continue tube feeding as instructed;      7. Elevated prostate specific antigen (PSA)    8. Benign prostatic hyperplasia, unspecified whether lower urinary tract symptoms present  -     doxazosin (CARDURA) 1 MG tablet; Take 1 tablet by mouth Daily.  Dispense: 90 tablet; Refill: 0    9. Cough  -     albuterol sulfate  (90 Base) MCG/ACT inhaler; Inhale 2 puffs Every 4 (Four) Hours As Needed for Wheezing.  Dispense: 8 g; Refill: 0    10. Gastrostomy tube in place (HCC)  Comments:  remain NPO until structure resolved  Tube feedings as recommended follow up closely with home health - follow up if site bleeding worsens  appears irritation    11. Intellectual disability        Follow Up   Return in about 4 weeks (around 3/15/2023).      New Medications Ordered This Visit   Medications   • albuterol sulfate  (90 Base) MCG/ACT inhaler     Sig: Inhale 2 puffs Every 4 (Four) Hours As Needed for Wheezing.     Dispense:  8 g     Refill:  0   • doxazosin (CARDURA) 1 MG tablet     Sig: Take 1 tablet by mouth Daily.     Dispense:  90 tablet     Refill:  0       Medications Discontinued During This Encounter   Medication Reason   • levoFLOXacin (LEVAQUIN) 500 MG tablet *Therapy completed   • Nirmatrelvir-Ritonavir (PAXLOVID, 300/100, PO) *Therapy completed   • Sancuso 3.1 MG/24HR *Therapy completed   • albuterol sulfate  (90 Base) MCG/ACT inhaler Reorder   • doxazosin (CARDURA) 1 MG tablet Reorder            Review of Systems   Respiratory: Positive for shortness of breath.        Objective     Vitals:    02/15/23 1104   BP: 106/73   BP Location: Right arm   Patient Position: Sitting   Cuff Size: Adult   Pulse: 110   Temp: 98.6 °F (37 °C)   TempSrc: Oral   SpO2: 96%   Weight: 53.6 kg (118 lb 3.2 oz)   Height: 162.6 cm (64\")     Body mass index is 20.29 kg/m².     Physical Exam  Vitals reviewed.   Constitutional:       Appearance: Normal appearance.   HENT:      Head: Normocephalic. "   Eyes:      Pupils: Pupils are equal, round, and reactive to light.   Cardiovascular:      Rate and Rhythm: Regular rhythm. Tachycardia present.      Heart sounds: No murmur heard.  Pulmonary:      Effort: Pulmonary effort is normal.      Breath sounds: Normal breath sounds. Decreased air movement present. No wheezing, rhonchi or rales.   Abdominal:       Musculoskeletal:         General: Normal range of motion.   Neurological:      Mental Status: He is alert.   Psychiatric:         Mood and Affect: Mood normal.         Behavior: Behavior normal.            Result Review                       No Known Allergies   Past Medical History:   Diagnosis Date   • Rock's esophagus    • Chronic obstructive pulmonary disease (HCC) 1/30/2023   • Colon polyp    • History of kidney stones    • Mental deficiency     Responds very slowly to answer questions   • Renal stones    • Smokers' cough ([facility])    • Sore throat    • Tremor      Current Outpatient Medications   Medication Sig Dispense Refill   • albuterol sulfate  (90 Base) MCG/ACT inhaler Inhale 2 puffs Every 4 (Four) Hours As Needed for Wheezing. 8 g 0   • dexamethasone (DECADRON) 4 MG tablet Take 4 mg by mouth.     • doxazosin (CARDURA) 1 MG tablet Take 1 tablet by mouth Daily. 90 tablet 0   • LORATADINE ALLERGY RELIEF PO Take  by mouth.     • nicotine (NICODERM CQ) 7 MG/24HR patch Place 1 patch on the skin as directed by provider Daily.     • polyethylene glycol (MiraLax) 17 GM/SCOOP powder Take 17 g by mouth Daily. May mix with any liquid 225 g 1   • primidone (MYSOLINE) 250 MG tablet TAKE 1 TABLET BY MOUTH UP TO THREE TIMES DAILY 90 tablet 3   • tiotropium (SPIRIVA) 18 MCG per inhalation capsule Place 18 mcg into inhaler and inhale Daily.     • ciprofloxacin (Cipro) 500 MG tablet Take 1 tablet by mouth 2 (Two) Times a Day for 3 days. 6 tablet 0   • dexamethasone (DECADRON) 6 MG tablet TAKE 1 TABLET BY MOUTH ONCE DAILY WITH BREAKFAST FOR 10 DAYS     •  HYDROcodone-acetaminophen (NORCO) 5-325 MG per tablet Take 1 tablet by mouth As Needed.     • Nirmatrelvir&Ritonavir 300/100 (PAXLOVID) 20 x 150 MG & 10 x 100MG tablet therapy pack tablet Take 3 tablets by mouth 2 (Two) Times a Day.     • nystatin (MYCOSTATIN) 100,000 unit/mL suspension Swish and spit 5 mL 4 (Four) Times a Day. 500 mL 0   • oxyCODONE-acetaminophen (PERCOCET) 5-325 MG per tablet Take 1 tablet by mouth.       No current facility-administered medications for this visit.     Past Surgical History:   Procedure Laterality Date   • ENDOSCOPY N/A 9/2/2022    Procedure: ESOPHAGOGASTRODUODENOSCOPY WITH BIOPSIES;  Surgeon: Eduardo Bustamante MD;  [facility] ENDOSCOPY;  Service: General;  Laterality: N/A;  ESOPHAGEAL MASS   • FEMUR SURGERY      earle placed   • KIDNEY STONE SURGERY        Health Maintenance Due   Topic Date Due   • TDAP/TD VACCINES (1 - Tdap) Never done   • ZOSTER VACCINE (1 of 2) Never done   • COLORECTAL CANCER SCREENING  03/08/2021      Immunization History   Administered Date(s) Administered   • COVID-19 (PFIZER) BIVALENT BOOSTER 12+YRS 03/27/2021, 04/17/2021   • COVID-19 (PFIZER) PURPLE CAP 03/27/2021, 04/17/2021, 01/05/2022   • Flu Vaccine Split Quad 10/14/2022   • FluLaval/Fluzone >6mos 09/14/2017   • Fluzone High Dose =>65 Years ([employer] ONLY) 09/14/2017   • Fluzone High-Dose 65+yrs 10/14/2022   • Pneumococcal Conjugate 13-Valent (PCV13) 01/15/2018   • Pneumococcal Conjugate 20-Valent (PCV20) 08/17/2022

## 2023-02-16 LAB — BACTERIA SPEC AEROBE CULT: ABNORMAL

## 2023-02-17 DIAGNOSIS — D72.819 LEUKOPENIA, UNSPECIFIED TYPE: ICD-10-CM

## 2023-02-17 DIAGNOSIS — R79.9 ELEVATED BUN: Primary | ICD-10-CM

## 2023-02-18 ENCOUNTER — DOCUMENTATION (OUTPATIENT)
Dept: FAMILY MEDICINE CLINIC | Age: 73
End: 2023-02-18
Payer: MEDICARE

## 2023-02-19 LAB
BACTERIA ANAL CULT: NORMAL
BACTERIA ANAL CULT: NORMAL

## 2023-02-20 ENCOUNTER — PATIENT OUTREACH (OUTPATIENT)
Dept: CASE MANAGEMENT | Facility: OTHER | Age: 73
End: 2023-02-20
Payer: MEDICARE

## 2023-02-20 NOTE — OUTREACH NOTE
AMBULATORY CASE MANAGEMENT NOTE    Name and Relationship of Patient/Support Person:  -     Called Bethany today to inform her to increase fluids to 6-8 oz up from the 4oz.  He was seen today Dr. Erick Wong and will get stress test, pft and if negative will proceed with open esophagogastrectomy.  B/P low today at visit.  Bethany will keep me notified on progress.  Scheduled on 2/22/23 for EGD.         Education Documentation  No documentation found.        Nohemi R  Ambulatory Case Management    2/20/2023, 15:09 EST

## 2023-02-22 ENCOUNTER — TELEPHONE (OUTPATIENT)
Dept: FAMILY MEDICINE CLINIC | Age: 73
End: 2023-02-22
Payer: MEDICARE

## 2023-02-23 ENCOUNTER — PROCEDURE VISIT (OUTPATIENT)
Dept: UROLOGY | Facility: CLINIC | Age: 73
End: 2023-02-23
Payer: MEDICARE

## 2023-02-23 DIAGNOSIS — N13.8 BPH WITH OBSTRUCTION/LOWER URINARY TRACT SYMPTOMS: ICD-10-CM

## 2023-02-23 DIAGNOSIS — N40.1 BPH WITH OBSTRUCTION/LOWER URINARY TRACT SYMPTOMS: ICD-10-CM

## 2023-02-23 DIAGNOSIS — R97.20 ELEVATED PROSTATE SPECIFIC ANTIGEN (PSA): Primary | ICD-10-CM

## 2023-02-23 PROCEDURE — 88305 TISSUE EXAM BY PATHOLOGIST: CPT | Performed by: UROLOGY

## 2023-02-23 PROCEDURE — 76942 ECHO GUIDE FOR BIOPSY: CPT | Performed by: UROLOGY

## 2023-02-23 PROCEDURE — 55700 PR PROSTATE NEEDLE BIOPSY ANY APPROACH: CPT | Performed by: UROLOGY

## 2023-02-23 RX ORDER — CIPROFLOXACIN 500 MG/1
500 TABLET, FILM COATED ORAL 2 TIMES DAILY
Qty: 6 TABLET | Refills: 0 | Status: SHIPPED | OUTPATIENT
Start: 2023-02-23 | End: 2023-02-26

## 2023-02-23 RX ORDER — DEXAMETHASONE 6 MG/1
TABLET ORAL
COMMUNITY
Start: 2023-02-09

## 2023-02-23 NOTE — TELEPHONE ENCOUNTER
Bethany called to report that she called dentist this am and was given a Rx for Amoxil.  He also had the esophageal stretching and can take oral liquids by mouth but no solids x 2 weeks. Has f/u March 13 Dr Burrell.  She also reports that no BM x 4 days, taking 1 capful of Miralax every morning, but he doesn't feel like he needs to go. Today they have appointments with eye doctor at 11am and biopsy for prostate today at 3pm.

## 2023-02-23 NOTE — PROGRESS NOTES
Patient has elevated PSA.  His PSA on 2/15/2023 was 13.700.  Patient is here for ultrasound of prostate and biopsy.  MANOLO shows nodule right apex.  Patient is here for ultrasound of prostate and biopsy.    Operation notes.  Patient was placed in left lateral position and ultrasound was done in axial and sagittal axis.  We sized his prostate and his heart was 36.4 mm in width is 49.1 mm.  Length is 43.8 mm and volume is 41.0 cm³.  Patient has lot of stones in the prostatic lobes more on the right than left.  Doppler of the prostate was done and it did show more hypervascularity than usual.  I went ahead with injection of 1% Xylocaine between seminal vesicle and base of the prostate on each side and then segmental biopsies of prostate was done taking 3 biopsies each in the left base, left mid and left apex.    Same thing was done with 3 biopsies each of the right base, right mid and right apex.  1 needle biopsy was done from the transitional zones.  Patient tolerated the procedure well and sent home in good condition

## 2023-02-23 NOTE — TELEPHONE ENCOUNTER
I spoke to Mr. Bolivar's sweet sister Bethany Blevins this evening.  She states that the patient has been diagnosed with an abscess tooth, and needs to have a tooth removed by oral surgeon, but is not going to be able to do so in the near future as he is undergoing evaluations for prostate mass and is diagnosed with esophogeal cancer.  He was in hospital on iv abx for a while and sent home with oral abx, but has been out of those for about a week.  This evening he has started to report pain again.  He has some old Norco and Percocet at home from old prescriptions that he didn't take much of.  I recommended that she give him Percocet 5/325 one tab Q 6 hrs prn and to call dentist/oral surgeon in the morning to see about getting another round of antibiotics, or if unable to get from them [facility] probably would be able to help with that.      mas

## 2023-02-24 ENCOUNTER — PATIENT OUTREACH (OUTPATIENT)
Dept: CASE MANAGEMENT | Facility: OTHER | Age: 73
End: 2023-02-24
Payer: MEDICARE

## 2023-02-24 NOTE — OUTREACH NOTE
AMBULATORY CASE MANAGEMENT NOTE    Name and Relationship of Patient/Support Person: Bethany Blevins L - Emergency Contact    Bethany called today to report that Emir did have a small BM , encouraged her to continue Miralax, she may increase for 3 days or until he gets a normal BM.     She also reports that yesterday Emir drank a milkshake and when it was time for his tube feedings, there was residual at 5pm and again at 8pm, so she did not administer.     He had a stress done today pre-operatively ordered by Dr. Erick Wong.      She also reports that yesterday Emir had a biopsy by urology.  On the way home he reports that for about a week he has been having trouble urinating.  The last time this happened they were in the hospital and he had to be catherized.  Spoke with PCP, she recommends to NOT cath after recent prostate biopsy.  Instructed Bethany:  If Emir has urinary retention this weekend, she will need to call [facility] and get advise.  OR she needs to take him to [facility] , NOT [facility] due to urologist is at [facility] hospital.  She verbalized understanding.     Education Documentation  No documentation found.        Nohemi PICKARD  Ambulatory Case Management    2/24/2023, 12:17 EST

## 2023-02-24 NOTE — TELEPHONE ENCOUNTER
Bethany reports had small BM - informed may increase Miralax x 3 days or until BM to bid if needed.

## 2023-02-25 PROBLEM — K22.2 ESOPHAGEAL STRICTURE: Status: ACTIVE | Noted: 2023-02-25

## 2023-02-25 PROBLEM — Z93.1 GASTROSTOMY TUBE IN PLACE (HCC): Status: ACTIVE | Noted: 2023-02-25

## 2023-02-27 ENCOUNTER — PATIENT OUTREACH (OUTPATIENT)
Dept: CASE MANAGEMENT | Facility: OTHER | Age: 73
End: 2023-02-27
Payer: MEDICARE

## 2023-02-27 LAB
CYTO UR: NORMAL
LAB AP CASE REPORT: NORMAL
LAB AP CLINICAL INFORMATION: NORMAL
PATH REPORT.FINAL DX SPEC: NORMAL
PATH REPORT.GROSS SPEC: NORMAL

## 2023-02-27 NOTE — OUTREACH NOTE
AMBULATORY CASE MANAGEMENT NOTE    Name and Relationship of Patient/Support Person: Bethany Blevins - Emergency Contact    Bethany called to report that Emir is taking good amount of liquids by mouth, he had 2 cups of coffee, tea and lemonade today and when Bethany has gone to give tube feedings at 9:30 and at 2:00pm, she met residual.  When I called her back at 3pm, she was able to administer finally.  She has asked Emir to wait and drink his coffee after she can give feeds in the am. He was agreeable.  She reports that he has a follow up with urology this week.  Ashley ordered labs and called [facility] with orders.     Education Documentation  No documentation found.        Nohemi PICKARD  Ambulatory Case Management    2/27/2023, 15:16 EST

## 2023-03-02 ENCOUNTER — OFFICE VISIT (OUTPATIENT)
Dept: UROLOGY | Facility: CLINIC | Age: 73
End: 2023-03-02
Payer: MEDICARE

## 2023-03-02 VITALS
DIASTOLIC BLOOD PRESSURE: 63 MMHG | HEIGHT: 64 IN | WEIGHT: 118 LBS | TEMPERATURE: 97.3 F | BODY MASS INDEX: 20.14 KG/M2 | SYSTOLIC BLOOD PRESSURE: 120 MMHG | HEART RATE: 94 BPM

## 2023-03-02 DIAGNOSIS — C61 PROSTATE CANCER: Primary | ICD-10-CM

## 2023-03-02 DIAGNOSIS — N30.00 ACUTE CYSTITIS WITHOUT HEMATURIA: ICD-10-CM

## 2023-03-02 LAB
BACTERIA UR QL AUTO: ABNORMAL /HPF
BILIRUB BLD-MCNC: NEGATIVE MG/DL
CLARITY, POC: CLEAR
COLOR UR: YELLOW
EPI CELLS #/AREA URNS HPF: 0 /[HPF]
EXPIRATION DATE: ABNORMAL
GLUCOSE UR STRIP-MCNC: NEGATIVE MG/DL
KETONES UR QL: NEGATIVE
LEUKOCYTE EST, POC: ABNORMAL
Lab: ABNORMAL
NITRITE UR-MCNC: NEGATIVE MG/ML
PH UR: 6.5 [PH] (ref 5–8)
PROT UR STRIP-MCNC: NEGATIVE MG/DL
RBC # UR STRIP: ABNORMAL /HPF
RBC # UR STRIP: ABNORMAL /UL
RENAL EPITHELIAL, POC: 0
SP GR UR: 1.01 (ref 1–1.03)
UNIDENT CRYS URNS QL MICRO: ABNORMAL /HPF
UROBILINOGEN UR QL: ABNORMAL
WBC # UR STRIP: ABNORMAL /HPF

## 2023-03-02 PROCEDURE — 99214 OFFICE O/P EST MOD 30 MIN: CPT | Performed by: UROLOGY

## 2023-03-02 PROCEDURE — 87086 URINE CULTURE/COLONY COUNT: CPT | Performed by: UROLOGY

## 2023-03-02 PROCEDURE — 81003 URINALYSIS AUTO W/O SCOPE: CPT | Performed by: UROLOGY

## 2023-03-02 RX ORDER — PANTOPRAZOLE SODIUM 40 MG/1
1 TABLET, DELAYED RELEASE ORAL DAILY
COMMUNITY
Start: 2023-02-23

## 2023-03-02 RX ORDER — CIPROFLOXACIN 500 MG/1
500 TABLET, FILM COATED ORAL 2 TIMES DAILY
Qty: 14 TABLET | Refills: 0 | Status: SHIPPED | OUTPATIENT
Start: 2023-03-02

## 2023-03-02 RX ORDER — AMOXICILLIN 875 MG/1
TABLET, COATED ORAL
COMMUNITY
Start: 2023-02-23

## 2023-03-02 RX ORDER — TAMSULOSIN HYDROCHLORIDE 0.4 MG/1
1 CAPSULE ORAL DAILY
Qty: 90 CAPSULE | Refills: 3 | Status: SHIPPED | OUTPATIENT
Start: 2023-03-02 | End: 2023-05-31

## 2023-03-02 NOTE — PROGRESS NOTES
"Chief Complaint  Results and Follow-up (Here for prostate biopsy results)    UTI    Subjective  No acute distress        Tristan Bolivar presents to [facility] UROLOGY  History of Present Illness    Patient has difficulty with urination.  No dysuria or gross hematuria.  No problem after the biopsy of prostate.  Patient has no fever or chills    Objective   Vital Signs:   /63   Pulse 94   Temp 97.3 °F (36.3 °C)   Ht 162.6 cm (64\")   Wt 53.5 kg (118 lb)   BMI 20.25 kg/m²     No Known Allergies   Past medical history:  has a past medical history of Rock's esophagus, Chronic obstructive pulmonary disease (HCC) (01/30/2023), Colon polyp, Elevated PSA, History of kidney stones, Mental deficiency, Prostate cancer (HCC), Renal stones, Smokers' cough (HCC), Sore throat, and Tremor.   Past surgical history:  has a past surgical history that includes Femur Surgery; Kidney stone surgery; and Esophagogastroduodenoscopy (N/A, 9/2/2022).  Personal history: family history includes Alzheimer's disease in an other family member; Cancer in his brother and father; Diabetes type II in an other family member; Heart attack in an other family member.  Social history:  reports that he quit smoking about 1 months ago. His smoking use included cigarettes. He has a 100.00 pack-year smoking history. He has never used smokeless tobacco. He reports that he does not currently use alcohol. He reports that he does not use drugs.    Review of Systems     Physical Exam  Constitutional:       General: He is not in acute distress.     Appearance: Normal appearance. He is normal weight. He is not ill-appearing or toxic-appearing.   HENT:      Head: Normocephalic and atraumatic.      Ears:      Comments: No hearing loss  Skin:     General: Skin is warm.      Coloration: Skin is not jaundiced.   Neurological:      Mental Status: He is alert and oriented to person, place, and time.      Motor: No weakness.      Gait: Gait " normal.   Psychiatric:         Mood and Affect: Mood normal.         Behavior: Behavior normal.      Comments: Patient is slightly mentally handicapped        Result Review :                 Assessment and Plan    Diagnoses and all orders for this visit:    1. Prostate cancer (HCC) (Primary)  -     POC Urinalysis Dipstick, Automated    2. Acute cystitis without hematuria      I have discussed the pathology report with the patient and his sister.  Patient has a low-grade prostate cancer but very high volume because all the biopsies are positive for prostate cancer.  Moran score is 3+3.  With him mentally handicapped, I would recommend radiation to the prostate.  Patient is already getting radiation for his esophageal cancer and [facility] so I am going to recommend and refer him to radiologist there.  I will be happy to start him on Depo-Lupron and Casodex in case the radiologist wants me to do it or Dr. Wogn in [facility] can do that.  All the questions were answered.    Patient also has UTI and I will give him Cipro 500 mg twice a day for 1 week.    Spent 25 minutes on the patient  Brief Urine Lab Results  (Last result in the past 365 days)      Color   Clarity   Blood   Leuk Est   Nitrite   Protein   CREAT   Urine HCG        03/02/23 1602 Yellow   Clear   Small   Large (3+)   Negative   Negative                  Follow Up   No follow-ups on file.  Patient was given instructions and counseling regarding his condition or for health maintenance advice. Please see specific information pulled into the AVS if appropriate.     Aniyah Borges MD

## 2023-03-03 DIAGNOSIS — C61 PROSTATE CANCER: Primary | ICD-10-CM

## 2023-03-04 LAB — BACTERIA SPEC AEROBE CULT: ABNORMAL

## 2023-03-06 ENCOUNTER — TELEPHONE (OUTPATIENT)
Dept: UROLOGY | Facility: CLINIC | Age: 73
End: 2023-03-06
Payer: MEDICARE

## 2023-03-06 ENCOUNTER — PATIENT OUTREACH (OUTPATIENT)
Dept: CASE MANAGEMENT | Facility: OTHER | Age: 73
End: 2023-03-06
Payer: MEDICARE

## 2023-03-06 DIAGNOSIS — B37.41 YEAST CYSTITIS: Primary | ICD-10-CM

## 2023-03-06 DIAGNOSIS — R97.20 ELEVATED PROSTATE SPECIFIC ANTIGEN (PSA): Primary | ICD-10-CM

## 2023-03-06 RX ORDER — FLUCONAZOLE 200 MG/1
200 TABLET ORAL DAILY
Qty: 3 TABLET | Refills: 0 | Status: SHIPPED | OUTPATIENT
Start: 2023-03-06 | End: 2023-03-09

## 2023-03-06 NOTE — OUTREACH NOTE
AMBULATORY CASE MANAGEMENT NOTE    Name and Relationship of Patient/Support Person: Bethany Blevins - Emergency Contact    Called and spoke with Bethany.  She voiced concern about Dr. Blackburn and Dr. Burrell appointment same day/time.  Called Dr. Burrell office to reschedule.      Bethany reports that Dr. Borges did confirm that his biopsy did confirm prostate cancer. Dr. Erick Wong also called to state that he is a candidate for radiation and he is going to consult with Dr. PIPPA Wong to arrange.      In regards to his tube feeding, he was cleared by gen surg office to advance his diet and has tolerated soft foods well.  He ate fish and potato skins, yogurt, pudding, more fish this weekend.      Education Documentation  No documentation found.        Nohemi PICKARD  Ambulatory Case Management    3/6/2023, 13:31 EST

## 2023-03-06 NOTE — TELEPHONE ENCOUNTER
HUB TO READ: Left v/m to let patient know [facility]

## 2023-03-08 ENCOUNTER — PATIENT OUTREACH (OUTPATIENT)
Dept: CASE MANAGEMENT | Facility: OTHER | Age: 73
End: 2023-03-08
Payer: MEDICARE

## 2023-03-08 NOTE — OUTREACH NOTE
AMBULATORY CASE MANAGEMENT NOTE    Name and Relationship of Patient/Support Person: Bethany Blevins - Emergency Contact    Bethany called to report that Dr. Wong called and he is to have surgery on March 16th at [facility].  Preop testing beforehand.  Dr. Borges thinks he may have another yeast infection and has treated him again with Diflucan.    Next week they have pulmonology, gen surgery, dr. Wong and surgery.      Education Documentation  No documentation found.        Nohemi PICKARD  Ambulatory Case Management    3/8/2023, 12:35 EST

## 2023-03-13 ENCOUNTER — TELEPHONE (OUTPATIENT)
Dept: CASE MANAGEMENT | Facility: OTHER | Age: 73
End: 2023-03-13
Payer: MEDICARE

## 2023-03-13 ENCOUNTER — OFFICE VISIT (OUTPATIENT)
Dept: PULMONOLOGY | Facility: CLINIC | Age: 73
End: 2023-03-13
Payer: MEDICARE

## 2023-03-13 ENCOUNTER — TELEPHONE (OUTPATIENT)
Dept: FAMILY MEDICINE CLINIC | Age: 73
End: 2023-03-13
Payer: MEDICARE

## 2023-03-13 VITALS
HEIGHT: 64 IN | BODY MASS INDEX: 22.36 KG/M2 | OXYGEN SATURATION: 100 % | DIASTOLIC BLOOD PRESSURE: 59 MMHG | HEART RATE: 94 BPM | RESPIRATION RATE: 16 BRPM | WEIGHT: 131 LBS | SYSTOLIC BLOOD PRESSURE: 95 MMHG

## 2023-03-13 DIAGNOSIS — F17.201 TOBACCO ABUSE, IN REMISSION: ICD-10-CM

## 2023-03-13 DIAGNOSIS — J43.2 CENTRILOBULAR EMPHYSEMA: Primary | ICD-10-CM

## 2023-03-13 DIAGNOSIS — J94.8 PLEURAL SCARRING: ICD-10-CM

## 2023-03-13 DIAGNOSIS — R05.3 CHRONIC COUGH: ICD-10-CM

## 2023-03-13 DIAGNOSIS — C15.9 ADENOCARCINOMA OF ESOPHAGUS: ICD-10-CM

## 2023-03-13 PROCEDURE — 1159F MED LIST DOCD IN RCRD: CPT | Performed by: INTERNAL MEDICINE

## 2023-03-13 PROCEDURE — 99204 OFFICE O/P NEW MOD 45 MIN: CPT | Performed by: INTERNAL MEDICINE

## 2023-03-13 PROCEDURE — 1160F RVW MEDS BY RX/DR IN RCRD: CPT | Performed by: INTERNAL MEDICINE

## 2023-03-13 PROCEDURE — 94664 DEMO&/EVAL PT USE INHALER: CPT | Performed by: INTERNAL MEDICINE

## 2023-03-13 RX ORDER — DOXAZOSIN MESYLATE 1 MG/1
TABLET ORAL
COMMUNITY
Start: 2023-03-08

## 2023-03-13 RX ORDER — ALBUTEROL SULFATE 90 UG/1
2 AEROSOL, METERED RESPIRATORY (INHALATION) EVERY 4 HOURS PRN
Qty: 6.7 G | Refills: 5 | Status: SHIPPED | OUTPATIENT
Start: 2023-03-13

## 2023-03-13 RX ORDER — BUDESONIDE, GLYCOPYRROLATE, AND FORMOTEROL FUMARATE 160; 9; 4.8 UG/1; UG/1; UG/1
2 AEROSOL, METERED RESPIRATORY (INHALATION) 2 TIMES DAILY
Qty: 1 EACH | Refills: 11 | Status: SHIPPED | OUTPATIENT
Start: 2023-03-13

## 2023-03-13 NOTE — TELEPHONE ENCOUNTER
Bethany called to report that she is concerned about Emir's feeding tube, she has noticed a little blood on the dressing around his feeding tube.  [facility]  However he is scheduled to see Dr. Burrell tomorrow and he can assess at the appointment.  When I spoke with Bethany she reports that she scheduled an appointment with Dr. Guidry at 1:30 tomorrow and then with Dr. Burrell at 2:45pm.    Saw Dr. Blackburn and told moderate COPD and gave sample inhaler.

## 2023-03-13 NOTE — PROGRESS NOTES
Primary Care Provider  Ashley Robertson APRN   Referring Provider  No ref. provider found      Patient Complaint  Establish Care, Esophageal Cancer (Completed radiation ), COPD, Emphysema, and Cough (Productive, clear or white foamy )      Subjective          Tristan Bolivar presents to [facility] PULMONARY & CRITICAL CARE MEDICINE      History of Presenting Illness  Tristan Bolivar is a 72 y.o. male former cigarette smoker with esophageal cancer status post concurrent chemoradiation and is being evaluated for resection by Dr. Tristan Wong in [place], recent diagnosis of prostate cancer being evaluated by Dr. Cole Wong for prostate radiation here for evaluation for COPD.  Patient had PFTs done as [facility] showing FEV1/FVC of 66% and a postbronchodilator FEV1 of 73% predicted.  This was just spirometry and no lung volumes or diffusing capacity was noted.  His surveillance CTs for his lung cancer do show significant emphysema and biapical pleural scarring.  The patient does have some developmental delay and his family is at bedside answering questions for him.  He has been disabled most of his life and they report he is never worked in a factory or foundry and has never done sandblasting.  He has a heavy smoking history and quit smoking in January 2023.  He has no dyspnea but has a very wet cough.  Is been constant for years.  Since having his esophageal stricture dilated, it has gotten better but it is had a constant coughwith thick white foamy secretions.  He was previously on Spiriva and albuterol which helped his cough previously.  He has been out of those for quite some time.    Denies dyspnea, wheezing, headaches, chest pain, weight loss or hemoptysis. Denies fevers, chills and night sweats.  He is able to perform ADLs without difficulties and denies any swollen glands/lymph nodes in the head or neck.    I have personally reviewed the review of systems,  past family, social, medical and surgical histories; and agree with their findings.      Review of Systems  Constitutional symptoms:  Denied complaints   Cardiovascular:  Denied complaints  Respiratory: Cough, otherwise denied complaints  Gastrointestinal: Denied complaints        Family History   Problem Relation Age of Onset   • Cancer Father    • Alzheimer's disease Other    • Heart attack Other    • Diabetes type II Other    • Cancer Brother    • Malig Hyperthermia Neg Hx         Social History     Socioeconomic History   • Marital status: Single   Tobacco Use   • Smoking status: Former     Packs/day: 2.00     Years: 56.00     Pack years: 112.00     Types: Cigarettes     Start date:      Quit date: 2023     Years since quittin.1     Passive exposure: Past   • Smokeless tobacco: Never   Vaping Use   • Vaping Use: Never used   Substance and Sexual Activity   • Alcohol use: Not Currently   • Drug use: Never   • Sexual activity: Defer        Past Medical History:   Diagnosis Date   • Rock's esophagus    • Chronic obstructive pulmonary disease (HCC) 2023   • Colon polyp    • Elevated PSA    • History of kidney stones    • Mental deficiency     Responds very slowly to answer questions   • Prostate cancer (HCC)    • Renal stones    • Smokers' cough (HCC)    • Sore throat    • Tremor         Immunization History   Administered Date(s) Administered   • COVID-19 (PFIZER) BIVALENT BOOSTER 12+YRS 2021, 2021   • COVID-19 (PFIZER) PURPLE CAP 2021, 2021, 2022   • Flu Vaccine Split Quad 10/14/2022   • FluLaval/Fluzone >6mos 2017   • Fluzone High Dose =>65 Years (Vaxcare ONLY) 2017   • Fluzone High-Dose 65+yrs 10/14/2022   • Pneumococcal Conjugate 13-Valent (PCV13) 01/15/2018   • Pneumococcal Conjugate 20-Valent (PCV20) 2022         No Known Allergies       Current Outpatient Medications:   •  doxazosin (CARDURA) 1 MG tablet, TAKE 1 TABLET BY G-TUBE ONCE DAILY,  Disp: , Rfl:   •  LORATADINE ALLERGY RELIEF PO, Take  by mouth., Disp: , Rfl:   •  nicotine (NICODERM CQ) 7 MG/24HR patch, Place 1 patch on the skin as directed by provider Daily., Disp: , Rfl:   •  nystatin (MYCOSTATIN) 100,000 unit/mL suspension, Swish and spit 5 mL 4 (Four) Times a Day., Disp: 500 mL, Rfl: 0  •  pantoprazole (PROTONIX) 40 MG EC tablet, Take 1 tablet by mouth Daily., Disp: , Rfl:   •  polyethylene glycol (MiraLax) 17 GM/SCOOP powder, Take 17 g by mouth Daily. May mix with any liquid, Disp: 225 g, Rfl: 1  •  primidone (MYSOLINE) 250 MG tablet, TAKE 1 TABLET BY MOUTH UP TO THREE TIMES DAILY, Disp: 90 tablet, Rfl: 3  •  albuterol sulfate  (90 Base) MCG/ACT inhaler, Inhale 2 puffs Every 4 (Four) Hours As Needed for Wheezing., Disp: 6.7 g, Rfl: 5  •  amoxicillin (AMOXIL) 875 MG tablet, , Disp: , Rfl:   •  Budeson-Glycopyrrol-Formoterol (Breztri Aerosphere) 160-9-4.8 MCG/ACT aerosol inhaler, Inhale 2 puffs 2 (Two) Times a Day., Disp: 1 each, Rfl: 11  •  ciprofloxacin (CIPRO) 500 MG tablet, Take 1 tablet by mouth 2 (Two) Times a Day., Disp: 14 tablet, Rfl: 0  •  dexamethasone (DECADRON) 4 MG tablet, Take 1 tablet by mouth., Disp: , Rfl:   •  dexamethasone (DECADRON) 6 MG tablet, TAKE 1 TABLET BY MOUTH ONCE DAILY WITH BREAKFAST FOR 10 DAYS, Disp: , Rfl:   •  HYDROcodone-acetaminophen (NORCO) 5-325 MG per tablet, Take 1 tablet by mouth As Needed., Disp: , Rfl:   •  Nirmatrelvir&Ritonavir 300/100 (PAXLOVID) 20 x 150 MG & 10 x 100MG tablet therapy pack tablet, Take 3 tablets by mouth 2 (Two) Times a Day., Disp: , Rfl:   •  oxyCODONE-acetaminophen (PERCOCET) 5-325 MG per tablet, Take 1 tablet by mouth., Disp: , Rfl:   •  tamsulosin (FLOMAX) 0.4 MG capsule 24 hr capsule, Take 1 capsule by mouth Daily for 90 days., Disp: 90 capsule, Rfl: 3  •  tiotropium (SPIRIVA) 18 MCG per inhalation capsule, Place 1 capsule into inhaler and inhale Daily., Disp: , Rfl:          Objective     Vital Signs:   BP 95/59 (BP  "Location: Left arm, Patient Position: Sitting, Cuff Size: Adult)   Pulse 94   Resp 16   Ht 162.6 cm (64\")   Wt 59.4 kg (131 lb)   SpO2 100% Comment: Room air.  BMI 22.49 kg/m²     Physical Exam  Vital Signs Reviewed   WDWN, Alert, NAD.    HEENT:  PERRL, EOMI.  OP, nares clear, no sinus tenderness  Neck:  Supple, no JVD, no thyromegaly  Lymph: no axillary, cervical, supraclavicular lymphadenopathy noted bilaterally  Chest:  good aeration, barrel chested, trace rhonchi bilaterally, tympanic to percussion bilaterally, no work of breathing noted  CV: RRR, no MGR, pulses 2+, equal.  Abd:  Soft, NT, ND, + BS, no HSM, PEG tube site with small crusting of blood around it with a little bit of mucosal irritation  EXT:  no clubbing, no cyanosis, no edema, no joint tenderness  Neuro:  A&Ox3, CN grossly intact, no focal deficits.  Skin: No rashes or lesions noted       Result Review :   I have personally reviewed the office notes from primary care, Dr. Borges of urology, Dr. Cole Wong of oncology and Dr. Tristan Wong of surgical oncology [facility].  Most recent CBC shows leukopenia chronic anemia but no thrombocytopenia.  No peripheral eosinophilia.  CMP with no evidence of chronic hypercapnic respiratory failure.  PFTs from [facility] reviewed showing spirometry only with airflow obstruction with postbronchodilator FEV1 of 72% predicted.  CT of the chest/abdomen/pelvis shows stable findings status post chemoradiation for esophageal cancer.  Does have emphysematous changes with biapical parenchymal scarring.         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  Chronic bronchitis phenotype.  Postbronchodilator FEV1 72% predicted.  Would benefit from being on LAMA/LABA therapy with albuterol as needed.  COPD assessment test score is 6 today suggestive of adequate disease control  Chronic cough.  Somewhat better since having a radiation stricture of esophagus dilated.  Still has chronic cough related to chronic bronchitis and would benefit from going back on controller inhaler therapy  Esophageal cancer status post chemoradiation and planning for possible resection  Recent diagnosis of prostate cancer.  Being evaluated for prostate radiation  Tobacco use of cigarettes in remission  Biapical pleural thickening/pulmonary scarring.  No evidence of pneumoconiosis  Leukopenia  Chronic anemia    Plan:  Patient appears to have some chronic bronchitis phenotype.  Having his esophageal stricture dilated has helped his cough some however he still has ongoing chronic bronchitis symptoms.  He also has a PFT consistent with mild COPD.  We will do triple inhaler therapy with Breztri 2 puffs twice a day and start albuterol as needed.  Inhaler education provided today.  Patient's family was concerned about some bleeding around his PEG tube site and mucosal tissue/irritation.  I have asked them to contact the surgeon that placed this, Dr. Guidry for evaluation.  Management of esophageal cancer per Dr. Tristan Wong  in [place].  Planning likely surgical resection after completing chemoradiation  Evaluation for prostate cancer radiation per Dr. Cole Wong in [place]  Check alpha-1 antitrypsin level and genotype  Smoking status: Former smoker.  Quit January 2023  Vaccination status: Up-to-date with flu, Prevnar, Pneumovax and COVID-19 vaccinations  Medications personally reviewed.      Follow Up   Return in about 3 months (around 6/13/2023).  Patient was given instructions and counseling regarding his condition or for health maintenance advice. Please see specific information pulled into the AVS if appropriate.     Electronically signed by Saroj Blackburn MD, 03/13/23, 10:20 AM EDT.       Addendum:  Patient is at moderate risk of perioperative pulmonary complications for esophagectomy given his PFT findings.  From pulmonary perspective, he is cleared for procedure.  Recommend early postoperative ambulation and incentive spirometer use.    Electronically signed by Saroj Blackburn MD, 03/14/23, 2:59 PM EDT.

## 2023-03-13 NOTE — TELEPHONE ENCOUNTER
Attempted to call an speak with Emir regarding labs that were ordered on 2/15. Pt didn't have VM set up. If pt calls back see if he still wants to complete his CBC/CMP labs. (1st attempt)./joanne

## 2023-03-15 RX ORDER — BUDESONIDE, GLYCOPYRROLATE, AND FORMOTEROL FUMARATE 160; 9; 4.8 UG/1; UG/1; UG/1
2 AEROSOL, METERED RESPIRATORY (INHALATION) 2 TIMES DAILY
Qty: 1 EACH | Refills: 0 | COMMUNITY
Start: 2023-03-13 | End: 2023-03-14

## 2023-03-21 ENCOUNTER — PATIENT OUTREACH (OUTPATIENT)
Dept: CASE MANAGEMENT | Facility: OTHER | Age: 73
End: 2023-03-21
Payer: MEDICARE

## 2023-03-21 DIAGNOSIS — C15.9 ADENOCARCINOMA OF ESOPHAGUS: Primary | ICD-10-CM

## 2023-03-21 NOTE — OUTREACH NOTE
AMBULATORY CASE MANAGEMENT NOTE    Name and Relationship of Patient/Support Person: Bethany Blevins - Emergency Contact    Called and left message regarding Emir's status.  Requested for Bethany to call back.     Texhoma R  Ambulatory Case Management    3/21/2023, 15:35 EDT   AMBULATORY CASE MANAGEMENT NOTE    Name and Relationship of Patient/Support Person: Bethany Blevins - Emergency Contact    Left message with Bethany to call back with update.     Texhoma R  Ambulatory Case Management    3/23/2023, 16:05 EDT   AMBULATORY CASE MANAGEMENT NOTE    Name and Relationship of Patient/Support Person: Bethany Blevins - Emergency Contact    Bethany called back to report that Emir is inpatient hospital since surgery.  Several chest tubes.  He will be discharged to SNF - She will report when he gets discharged.     Education Documentation  No documentation found.        Texhoma R  Ambulatory Case Management    3/24/2023, 15:22 EDT  
(4) walks frequently

## 2023-04-11 ENCOUNTER — PATIENT OUTREACH (OUTPATIENT)
Dept: CASE MANAGEMENT | Facility: OTHER | Age: 73
End: 2023-04-11
Payer: MEDICARE

## 2023-04-11 NOTE — OUTREACH NOTE
AMBULATORY CASE MANAGEMENT NOTE    Name and Relationship of Patient/Support Person:  -       Emir has been discharged from hospital and in Rehab.  He had an EGD yesterday and appt with Dr. Wong.  Will continue to follow.     Education Documentation  No documentation found.        Nohemi PICKARD  Ambulatory Case Management    4/11/2023, 16:21 EDT

## 2023-04-12 ENCOUNTER — DOCUMENTATION (OUTPATIENT)
Dept: FAMILY MEDICINE CLINIC | Age: 73
End: 2023-04-12
Payer: MEDICARE

## 2023-04-13 ENCOUNTER — PATIENT OUTREACH (OUTPATIENT)
Dept: CASE MANAGEMENT | Facility: OTHER | Age: 73
End: 2023-04-13
Payer: MEDICARE

## 2023-04-13 NOTE — OUTREACH NOTE
AMBULATORY CASE MANAGEMENT NOTE    Name and Relationship of Patient/Support Person: Bethany Blevins - Emergency Contact    Bethany called to report that Emir is at [facility].  He had a stent place with this EGd.  His current discharge plans are : once he receives a test with dye ( unsure who is ordering) that he can go home after completion.  Inquired when this was supposed to be done and she is not sure but she thought it was going to be done this week.      She also states that she would like suction apparatus set up at home and she is willing to buy online.  Explained that if she could get an order from someone with sufficient evidence/documentation (nursing home or surgeon office) insurance should pay for.  She is also talking about needing pole and supplies for his J-tube.  I have contacted the  at [facility] to find out his needs so when he goes home with all the supplies he needs.     Education Documentation  No documentation found.        Nohemi PICKARD  Ambulatory Case Management    4/13/2023, 10:35 EDT

## 2023-04-13 NOTE — PROGRESS NOTES
Call from patient’s sister relating that he’s at the nursing home having had an esophageal stent placed two days ago. She related has been coughing all day and that the staff and physician at the facility ordered suction but it doesn’t seem to be helping. Advise her to discuss the situation with the nursing home staff, but that if she felt he needed to go to the emergency room again, she should request that they sent him there.

## 2023-04-21 ENCOUNTER — TELEPHONE (OUTPATIENT)
Dept: FAMILY MEDICINE CLINIC | Age: 73
End: 2023-04-21

## 2023-04-21 NOTE — TELEPHONE ENCOUNTER
Caller: Bethany Blevins    Relationship: Emergency Contact    Best call back number: [phone]    What is the best time to reach you: ANY    Who are you requesting to speak with (clinical staff, provider,  specific staff member): CLINICAL    Do you know the name of the person who called: SISTER    What was the call regarding: PLEASE CALL PATIENT'S SISTER BACK AND ADVISE.    Do you require a callback: YES

## 2023-04-21 NOTE — TELEPHONE ENCOUNTER
Bethany called and was inquiring why he was in isolation whether it was sputum culture.  I could not find any documentation to support.  Left message with Bethany.

## (undated) DEVICE — SOL IRRG H2O PL/BG 1000ML STRL

## (undated) DEVICE — Device: Brand: DEFENDO AIR/WATER/SUCTION AND BIOPSY VALVE

## (undated) DEVICE — SINGLE-USE BIOPSY FORCEPS: Brand: RADIAL JAW 4

## (undated) DEVICE — EGD OR ERCP KIT: Brand: MEDLINE INDUSTRIES, INC.

## (undated) DEVICE — SOL IRR NACL 0.9PCT BT 1000ML